# Patient Record
Sex: MALE | Race: BLACK OR AFRICAN AMERICAN | Employment: UNEMPLOYED | URBAN - METROPOLITAN AREA
[De-identification: names, ages, dates, MRNs, and addresses within clinical notes are randomized per-mention and may not be internally consistent; named-entity substitution may affect disease eponyms.]

---

## 2021-05-21 ENCOUNTER — APPOINTMENT (OUTPATIENT)
Dept: ULTRASOUND IMAGING | Age: 31
DRG: 351 | End: 2021-05-21
Payer: MEDICAID

## 2021-05-21 ENCOUNTER — HOSPITAL ENCOUNTER (INPATIENT)
Age: 31
LOS: 3 days | Discharge: HOME | DRG: 351 | End: 2021-05-24
Attending: EMERGENCY MEDICINE | Admitting: INTERNAL MEDICINE
Payer: MEDICAID

## 2021-05-21 DIAGNOSIS — M62.82 NON-TRAUMATIC RHABDOMYOLYSIS: Primary | ICD-10-CM

## 2021-05-21 DIAGNOSIS — R74.01 TRANSAMINITIS: ICD-10-CM

## 2021-05-21 PROBLEM — N50.89 TESTICULAR MICROLITHIASIS: Status: ACTIVE | Noted: 2021-05-21

## 2021-05-21 LAB
-: NORMAL
ABSOLUTE EOS #: 0.11 K/UL (ref 0–0.44)
ABSOLUTE EOS #: 0.17 K/UL (ref 0–0.44)
ABSOLUTE IMMATURE GRANULOCYTE: 0.04 K/UL (ref 0–0.3)
ABSOLUTE IMMATURE GRANULOCYTE: 0.05 K/UL (ref 0–0.3)
ABSOLUTE LYMPH #: 1.45 K/UL (ref 1.1–3.7)
ABSOLUTE LYMPH #: 1.78 K/UL (ref 1.1–3.7)
ABSOLUTE MONO #: 0.57 K/UL (ref 0.1–1.2)
ABSOLUTE MONO #: 0.58 K/UL (ref 0.1–1.2)
ACETAMINOPHEN LEVEL: <5 UG/ML (ref 10–30)
ALBUMIN SERPL-MCNC: 4.5 G/DL (ref 3.5–5.2)
ALBUMIN/GLOBULIN RATIO: 1.6 (ref 1–2.5)
ALP BLD-CCNC: 72 U/L (ref 40–129)
ALT SERPL-CCNC: 301 U/L (ref 5–41)
AMORPHOUS: NORMAL
AMPHETAMINE SCREEN URINE: NEGATIVE
ANION GAP SERPL CALCULATED.3IONS-SCNC: 7 MMOL/L (ref 9–17)
AST SERPL-CCNC: 2206 U/L
BACTERIA: NORMAL
BARBITURATE SCREEN URINE: NEGATIVE
BASOPHILS # BLD: 0 % (ref 0–2)
BASOPHILS # BLD: 0 % (ref 0–2)
BASOPHILS ABSOLUTE: 0.03 K/UL (ref 0–0.2)
BASOPHILS ABSOLUTE: <0.03 K/UL (ref 0–0.2)
BENZODIAZEPINE SCREEN, URINE: NEGATIVE
BILIRUB SERPL-MCNC: 0.84 MG/DL (ref 0.3–1.2)
BILIRUBIN URINE: NEGATIVE
BUN BLDV-MCNC: 8 MG/DL (ref 6–20)
BUN/CREAT BLD: ABNORMAL (ref 9–20)
BUPRENORPHINE URINE: NORMAL
CALCIUM SERPL-MCNC: 9.9 MG/DL (ref 8.6–10.4)
CANNABINOID SCREEN URINE: NEGATIVE
CASTS UA: NORMAL /LPF (ref 0–8)
CHLORIDE BLD-SCNC: 103 MMOL/L (ref 98–107)
CO2: 27 MMOL/L (ref 20–31)
COCAINE METABOLITE, URINE: NEGATIVE
COLOR: YELLOW
COMMENT UA: ABNORMAL
CREAT SERPL-MCNC: 0.68 MG/DL (ref 0.7–1.2)
CRYSTALS, UA: NORMAL /HPF
DIFFERENTIAL TYPE: ABNORMAL
DIFFERENTIAL TYPE: ABNORMAL
EOSINOPHILS RELATIVE PERCENT: 1 % (ref 1–4)
EOSINOPHILS RELATIVE PERCENT: 2 % (ref 1–4)
EPITHELIAL CELLS UA: NORMAL /HPF (ref 0–5)
ETHANOL PERCENT: <0.01 %
ETHANOL: <10 MG/DL
GFR AFRICAN AMERICAN: >60 ML/MIN
GFR NON-AFRICAN AMERICAN: >60 ML/MIN
GFR SERPL CREATININE-BSD FRML MDRD: ABNORMAL ML/MIN/{1.73_M2}
GFR SERPL CREATININE-BSD FRML MDRD: ABNORMAL ML/MIN/{1.73_M2}
GLUCOSE BLD-MCNC: 105 MG/DL (ref 70–99)
GLUCOSE URINE: NEGATIVE
HCT VFR BLD CALC: 48.6 % (ref 40.7–50.3)
HCT VFR BLD CALC: 50 % (ref 40.7–50.3)
HEMOGLOBIN: 15.9 G/DL (ref 13–17)
HEMOGLOBIN: 16.5 G/DL (ref 13–17)
IMMATURE GRANULOCYTES: 0 %
IMMATURE GRANULOCYTES: 1 %
KETONES, URINE: NEGATIVE
LACTIC ACID, WHOLE BLOOD: 1.6 MMOL/L (ref 0.7–2.1)
LACTIC ACID: NORMAL MMOL/L
LEUKOCYTE ESTERASE, URINE: NEGATIVE
LYMPHOCYTES # BLD: 14 % (ref 24–43)
LYMPHOCYTES # BLD: 17 % (ref 24–43)
MCH RBC QN AUTO: 28.9 PG (ref 25.2–33.5)
MCH RBC QN AUTO: 29.2 PG (ref 25.2–33.5)
MCHC RBC AUTO-ENTMCNC: 32.7 G/DL (ref 28.4–34.8)
MCHC RBC AUTO-ENTMCNC: 33 G/DL (ref 28.4–34.8)
MCV RBC AUTO: 87.6 FL (ref 82.6–102.9)
MCV RBC AUTO: 89.2 FL (ref 82.6–102.9)
MDMA URINE: NORMAL
METHADONE SCREEN, URINE: NEGATIVE
METHAMPHETAMINE, URINE: NORMAL
MONOCYTES # BLD: 6 % (ref 3–12)
MONOCYTES # BLD: 6 % (ref 3–12)
MUCUS: NORMAL
MYOGLOBIN: 2894 NG/ML (ref 28–72)
MYOGLOBIN: 4830 NG/ML (ref 28–72)
NITRITE, URINE: NEGATIVE
NRBC AUTOMATED: 0 PER 100 WBC
NRBC AUTOMATED: 0 PER 100 WBC
OPIATES, URINE: NEGATIVE
OTHER OBSERVATIONS UA: NORMAL
OXYCODONE SCREEN URINE: NEGATIVE
PDW BLD-RTO: 12.9 % (ref 11.8–14.4)
PDW BLD-RTO: 13.1 % (ref 11.8–14.4)
PH UA: 7 (ref 5–8)
PHENCYCLIDINE, URINE: NEGATIVE
PLATELET # BLD: 263 K/UL (ref 138–453)
PLATELET # BLD: ABNORMAL K/UL (ref 138–453)
PLATELET ESTIMATE: ABNORMAL
PLATELET ESTIMATE: ABNORMAL
PLATELET, FLUORESCENCE: 204 K/UL (ref 138–453)
PLATELET, IMMATURE FRACTION: 10.1 % (ref 1.1–10.3)
PMV BLD AUTO: 11.9 FL (ref 8.1–13.5)
PMV BLD AUTO: ABNORMAL FL (ref 8.1–13.5)
POTASSIUM SERPL-SCNC: 4.9 MMOL/L (ref 3.7–5.3)
PROPOXYPHENE, URINE: NORMAL
PROTEIN UA: ABNORMAL
RBC # BLD: 5.45 M/UL (ref 4.21–5.77)
RBC # BLD: 5.71 M/UL (ref 4.21–5.77)
RBC # BLD: ABNORMAL 10*6/UL
RBC # BLD: ABNORMAL 10*6/UL
RBC UA: NORMAL /HPF (ref 0–4)
RENAL EPITHELIAL, UA: NORMAL /HPF
SALICYLATE LEVEL: <1 MG/DL (ref 3–10)
SEG NEUTROPHILS: 75 % (ref 36–65)
SEG NEUTROPHILS: 78 % (ref 36–65)
SEGMENTED NEUTROPHILS ABSOLUTE COUNT: 7.79 K/UL (ref 1.5–8.1)
SEGMENTED NEUTROPHILS ABSOLUTE COUNT: 7.91 K/UL (ref 1.5–8.1)
SODIUM BLD-SCNC: 137 MMOL/L (ref 135–144)
SPECIFIC GRAVITY UA: 1.01 (ref 1–1.03)
TEST INFORMATION: NORMAL
TOTAL CK: ABNORMAL U/L (ref 39–308)
TOTAL CK: ABNORMAL U/L (ref 39–308)
TOTAL PROTEIN: 7.3 G/DL (ref 6.4–8.3)
TOXIC TRICYCLIC SC,BLOOD: NEGATIVE
TRICHOMONAS: NORMAL
TRICYCLIC ANTIDEPRESSANTS, UR: NORMAL
TURBIDITY: CLEAR
URINE HGB: ABNORMAL
UROBILINOGEN, URINE: NORMAL
WBC # BLD: 10.1 K/UL (ref 3.5–11.3)
WBC # BLD: 10.4 K/UL (ref 3.5–11.3)
WBC # BLD: ABNORMAL 10*3/UL
WBC # BLD: ABNORMAL 10*3/UL
WBC UA: NORMAL /HPF (ref 0–5)
YEAST: NORMAL

## 2021-05-21 PROCEDURE — 85025 COMPLETE CBC W/AUTO DIFF WBC: CPT

## 2021-05-21 PROCEDURE — 99222 1ST HOSP IP/OBS MODERATE 55: CPT | Performed by: INTERNAL MEDICINE

## 2021-05-21 PROCEDURE — 85055 RETICULATED PLATELET ASSAY: CPT

## 2021-05-21 PROCEDURE — 99284 EMERGENCY DEPT VISIT MOD MDM: CPT

## 2021-05-21 PROCEDURE — 83605 ASSAY OF LACTIC ACID: CPT

## 2021-05-21 PROCEDURE — 80179 DRUG ASSAY SALICYLATE: CPT

## 2021-05-21 PROCEDURE — 82550 ASSAY OF CK (CPK): CPT

## 2021-05-21 PROCEDURE — 80307 DRUG TEST PRSMV CHEM ANLYZR: CPT

## 2021-05-21 PROCEDURE — 6360000002 HC RX W HCPCS: Performed by: STUDENT IN AN ORGANIZED HEALTH CARE EDUCATION/TRAINING PROGRAM

## 2021-05-21 PROCEDURE — 80053 COMPREHEN METABOLIC PANEL: CPT

## 2021-05-21 PROCEDURE — 80143 DRUG ASSAY ACETAMINOPHEN: CPT

## 2021-05-21 PROCEDURE — 93976 VASCULAR STUDY: CPT

## 2021-05-21 PROCEDURE — 81001 URINALYSIS AUTO W/SCOPE: CPT

## 2021-05-21 PROCEDURE — 2580000003 HC RX 258: Performed by: STUDENT IN AN ORGANIZED HEALTH CARE EDUCATION/TRAINING PROGRAM

## 2021-05-21 PROCEDURE — 83874 ASSAY OF MYOGLOBIN: CPT

## 2021-05-21 PROCEDURE — 1200000000 HC SEMI PRIVATE

## 2021-05-21 PROCEDURE — G0480 DRUG TEST DEF 1-7 CLASSES: HCPCS

## 2021-05-21 RX ORDER — SODIUM CHLORIDE 0.9 % (FLUSH) 0.9 %
5-40 SYRINGE (ML) INJECTION EVERY 12 HOURS SCHEDULED
Status: DISCONTINUED | OUTPATIENT
Start: 2021-05-21 | End: 2021-05-24 | Stop reason: HOSPADM

## 2021-05-21 RX ORDER — SODIUM CHLORIDE 0.9 % (FLUSH) 0.9 %
5-40 SYRINGE (ML) INJECTION PRN
Status: DISCONTINUED | OUTPATIENT
Start: 2021-05-21 | End: 2021-05-24 | Stop reason: HOSPADM

## 2021-05-21 RX ORDER — ONDANSETRON 2 MG/ML
4 INJECTION INTRAMUSCULAR; INTRAVENOUS EVERY 6 HOURS PRN
Status: DISCONTINUED | OUTPATIENT
Start: 2021-05-21 | End: 2021-05-24 | Stop reason: HOSPADM

## 2021-05-21 RX ORDER — SODIUM CHLORIDE 9 MG/ML
25 INJECTION, SOLUTION INTRAVENOUS PRN
Status: DISCONTINUED | OUTPATIENT
Start: 2021-05-21 | End: 2021-05-24 | Stop reason: HOSPADM

## 2021-05-21 RX ORDER — SODIUM CHLORIDE 9 MG/ML
INJECTION, SOLUTION INTRAVENOUS CONTINUOUS
Status: DISCONTINUED | OUTPATIENT
Start: 2021-05-21 | End: 2021-05-24 | Stop reason: HOSPADM

## 2021-05-21 RX ORDER — ACETAMINOPHEN 325 MG/1
650 TABLET ORAL EVERY 6 HOURS PRN
Status: DISCONTINUED | OUTPATIENT
Start: 2021-05-21 | End: 2021-05-22

## 2021-05-21 RX ORDER — SODIUM CHLORIDE, SODIUM LACTATE, POTASSIUM CHLORIDE, CALCIUM CHLORIDE 600; 310; 30; 20 MG/100ML; MG/100ML; MG/100ML; MG/100ML
1000 INJECTION, SOLUTION INTRAVENOUS ONCE
Status: COMPLETED | OUTPATIENT
Start: 2021-05-21 | End: 2021-05-21

## 2021-05-21 RX ORDER — ACETAMINOPHEN 650 MG/1
650 SUPPOSITORY RECTAL EVERY 6 HOURS PRN
Status: DISCONTINUED | OUTPATIENT
Start: 2021-05-21 | End: 2021-05-22

## 2021-05-21 RX ORDER — HEPARIN SODIUM 5000 [USP'U]/ML
5000 INJECTION, SOLUTION INTRAVENOUS; SUBCUTANEOUS EVERY 8 HOURS SCHEDULED
Status: DISCONTINUED | OUTPATIENT
Start: 2021-05-21 | End: 2021-05-24 | Stop reason: HOSPADM

## 2021-05-21 RX ORDER — POLYETHYLENE GLYCOL 3350 17 G/17G
17 POWDER, FOR SOLUTION ORAL DAILY PRN
Status: DISCONTINUED | OUTPATIENT
Start: 2021-05-21 | End: 2021-05-24

## 2021-05-21 RX ORDER — PROMETHAZINE HYDROCHLORIDE 12.5 MG/1
12.5 TABLET ORAL EVERY 6 HOURS PRN
Status: DISCONTINUED | OUTPATIENT
Start: 2021-05-21 | End: 2021-05-24 | Stop reason: HOSPADM

## 2021-05-21 RX ORDER — 0.9 % SODIUM CHLORIDE 0.9 %
1000 INTRAVENOUS SOLUTION INTRAVENOUS ONCE
Status: COMPLETED | OUTPATIENT
Start: 2021-05-21 | End: 2021-05-21

## 2021-05-21 RX ADMIN — SODIUM CHLORIDE, POTASSIUM CHLORIDE, SODIUM LACTATE AND CALCIUM CHLORIDE 1000 ML: 600; 310; 30; 20 INJECTION, SOLUTION INTRAVENOUS at 09:48

## 2021-05-21 RX ADMIN — SODIUM CHLORIDE: 9 INJECTION, SOLUTION INTRAVENOUS at 15:51

## 2021-05-21 RX ADMIN — SODIUM CHLORIDE 1000 ML: 9 INJECTION, SOLUTION INTRAVENOUS at 12:36

## 2021-05-21 RX ADMIN — HEPARIN SODIUM 5000 UNITS: 5000 INJECTION INTRAVENOUS; SUBCUTANEOUS at 17:35

## 2021-05-21 RX ADMIN — HEPARIN SODIUM 5000 UNITS: 5000 INJECTION INTRAVENOUS; SUBCUTANEOUS at 21:42

## 2021-05-21 SDOH — HEALTH STABILITY: MENTAL HEALTH: HOW OFTEN DO YOU HAVE A DRINK CONTAINING ALCOHOL?: NEVER

## 2021-05-21 ASSESSMENT — PAIN SCALES - GENERAL: PAINLEVEL_OUTOF10: 6

## 2021-05-21 ASSESSMENT — ENCOUNTER SYMPTOMS
BACK PAIN: 0
VOMITING: 0
ABDOMINAL PAIN: 0
NAUSEA: 0
SHORTNESS OF BREATH: 0

## 2021-05-21 NOTE — ED NOTES
Dr. Santiago Krause at bedside to evaluate pt.  examination preformed at this time with writer at bedside to chaperone.       Elbert Gray RN  05/21/21 8275

## 2021-05-21 NOTE — PLAN OF CARE
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 Urology recommends no further inpatient work-up and recommends outpatient follow-up. Liliana Drummond MD  Internal Medicine Resident, PGY- [address] [place], [place]  5/21/2021, 12:21 PM

## 2021-05-21 NOTE — ED NOTES
Pt resting on cot, alert, oriented, no distress noted at this time. Guards at bedside. Awaiting bed placement, pt denies any further needs at this time, will continue with plan of care.       Hernandez Weiner RN  05/21/21 7967

## 2021-05-21 NOTE — ED PROVIDER NOTES
[facility] ED  Emergency Department Encounter  Emergency Medicine Resident     Pt Name: John Mujica  MRN: [id number]  [place] 1990  Date of evaluation: 5/21/21  PCP:  No primary care provider on file. CHIEF COMPLAINT       Chief Complaint   Patient presents with    Testicle Pain     left sided testicle pain, swelling,.  Hematuria     dark urine. was sent to [facility] yesterday 3 days after working out due to testicle pain, swelling, dark urine. pt states they admitted him to the [facility] due to rhabdo. pt states he worked out for the first time in over a year with lunges and squats. HISTORY OFPRESENT ILLNESS  (Location/Symptom, Timing/Onset, Context/Setting, Quality, Duration, Modifying Factors,Severity.)      John Mujica is a 35 y. o.yo male who presents with abnormal labs, testicular pain. Patient here with hematuria, testicular pain, states that he will was working out intensely 5 days ago, noticed testicular pain the day after his workout, hematuria 2 days out from the workout and has had a work-up yesterday that found his CK to be 15,000 and liver enzymes to be elevated. States he is feeling myalgias all over, continues to have some testicular pain and mild swelling. Denies any blood at the tip of his penis, denies traumatic injuries to the penis or testes. Denies prior history of rhabdo. Denies any kind of kidney history, muscular dystrophy history or history of any other muscular disorders. Denies chest pain shortness of breath, headache or vision changes or focal neuro deficits. Denies any flank pain, rashes, traumatic injuries such as falls. Denies any use of Tylenol excessively, denies history of hepatitis. PAST MEDICAL / SURGICAL / SOCIAL / FAMILY HISTORY      has no past medical history on file. has no past surgical history on file.      Social History     Socioeconomic History    Marital status: Single     Spouse name: Not on file    Number of children: Not on file    Years of education: Not on file    Highest education level: Not on file   Occupational History    Not on file   Tobacco Use    Smoking status: Never Smoker    Smokeless tobacco: Never Used   Substance and Sexual Activity    Alcohol use: Never    Drug use: Not Currently    Sexual activity: Not Currently   Other Topics Concern    Not on file   Social History Narrative    Not on file     Social Determinants of Health     Financial Resource Strain:     Difficulty of Paying Living Expenses:    Food Insecurity:     Worried About Running Out of Food in the Last Year:     [address] in the Last Year:    Transportation Needs:     Lack of Transportation (Medical):  Lack of Transportation (Non-Medical):    Physical Activity:     Days of Exercise per Week:     Minutes of Exercise per Session:    Stress:     Feeling of Stress :    Social Connections:     Frequency of Communication with Friends and Family:     Frequency of Social Gatherings with Friends and Family:     Attends [employer] Services:     Active Member of Clubs or Organizations:     Attends Club or Organization Meetings:     Marital Status:    Intimate Partner Violence:     Fear of Current or Ex-Partner:     Emotionally Abused:     Physically Abused:     Sexually Abused:        No family history on file. Allergies:  Patient has no known allergies. Home Medications:  Prior to Admission medications    Not on File       REVIEW OFSYSTEMS    (2-9 systems for level 4, 10 or more for level 5)      Review of Systems   Constitutional: Negative for diaphoresis and fever. HENT: Negative for congestion. Eyes: Negative for visual disturbance. Respiratory: Negative for shortness of breath. Cardiovascular: Negative for chest pain. Gastrointestinal: Negative for abdominal pain, nausea and vomiting. Endocrine: Negative for polyuria. Genitourinary: Positive for testicular pain. Negative for dysuria. Musculoskeletal: Positive for myalgias. Negative for back pain. Skin: Negative for wound. Neurological: Negative for headaches. Psychiatric/Behavioral: Negative for confusion. PHYSICAL EXAM   (up to 7 for level 4, 8 or more forlevel 5)      ED TRIAGE VITALS BP: (!) 166/91, Temp: 96.7 °F (35.9 °C), Pulse: 78, Resp: 18, SpO2: 98 %    Vitals:    05/21/21 0941 05/21/21 0946   BP: (!) 166/91    Pulse: 78    Resp: 18    Temp:  96.7 °F (35.9 °C)   TempSrc:  Oral   SpO2: 98%    Weight: 262 lb (118.8 kg)    Height: 6' 2\" (1.88 m)        Physical Exam  Constitutional:       General: He is not in acute distress. Appearance: He is well-developed. HENT:      Head: Normocephalic and atraumatic. Nose: Nose normal.   Eyes:      Pupils: Pupils are equal, round, and reactive to light. Cardiovascular:      Rate and Rhythm: Normal rate and regular rhythm. Heart sounds: No murmur heard. Pulmonary:      Effort: Pulmonary effort is normal. No respiratory distress. Breath sounds: No stridor. No wheezing. Abdominal:      General: There is no distension. Palpations: Abdomen is soft. Tenderness: There is no abdominal tenderness. Genitourinary:     Penis: No swelling. Testes:         Right: Tenderness present. Swelling not present. Left: Tenderness present. Swelling not present. Lux stage (genital): 5. Musculoskeletal:         General: No tenderness. Normal range of motion. Cervical back: Normal range of motion and neck supple. Skin:     General: Skin is warm and dry. Capillary Refill: Capillary refill takes less than 2 seconds. Findings: No erythema or rash. Neurological:      Mental Status: He is alert and oriented to person, place, and time. Sensory: No sensory deficit.       Deep Tendon Reflexes: Reflexes normal.   Psychiatric:         Behavior: Behavior normal.         DIFFERENTIAL  DIAGNOSIS     PLAN (LABS / IMAGING / EKG):  Orders Placed This Encounter   Procedures    US SCROTUM W LIMITED DUPLEX    CBC Auto Differential    Comprehensive Metabolic Panel w/ Reflex to MG    CK    MYOGLOBIN, SERUM    Urinalysis, reflex to microscopic    Lactic Acid, Plasma    Microscopic Urinalysis    DRUG SCREEN MULTI URINE    Basic Metabolic Panel w/ Reflex to MG    CK    MYOGLOBIN, SERUM    CBC auto differential    TOX SCR, BLD, ED    DIET RENAL;    Telemetry monitoring    Vital signs per unit routine    Notify physician    Up as tolerated    Strict intake and output    Inpatient consult to Internal Medicine    Inpatient consult to Case Management    OT eval and treat    PT evaluation and treat    Initiate Oxygen Therapy Protocol    Saline lock IV    Insert peripheral IV    PATIENT STATUS (FROM ED OR OR/PROCEDURAL) Inpatient       MEDICATIONS ORDERED:  Orders Placed This Encounter   Medications    lactated ringers infusion 1,000 mL    sodium chloride flush 0.9 % injection 5-40 mL    sodium chloride flush 0.9 % injection 5-40 mL    0.9 % sodium chloride infusion    OR Linked Order Group     promethazine (PHENERGAN) tablet 12.5 mg     ondansetron (ZOFRAN) injection 4 mg    polyethylene glycol (GLYCOLAX) packet 17 g    OR Linked Order Group     acetaminophen (TYLENOL) tablet 650 mg     acetaminophen (TYLENOL) suppository 650 mg    heparin (porcine) injection 5,000 Units    0.9 % sodium chloride bolus    0.9 % sodium chloride infusion       DDX:     Rhabdomyolysis, hepatitis, transaminitis, testicular torsion, UTI, kidney stones    Initial MDM/Plan: 35 y.o. male who presents with hematuria, abnormal labs. Rhabdomyolysis: CK myoglobin elevated severely, started on IV hydration admitted to internal medicine-likely secondary to intense exertion    Hepatitis/transaminitis: AST elevation likely secondary to muscle breakdown, has no history of hepatitis, not a daily drinker, no history of cirrhosis.     Testicular torsion: Here with bilateral testicular pain, no swelling, scrotal ultrasound shows: bilateral microlithiasis    UTI: No dysuria no frequency, no history of UTIs in the past, urinalysis negative    Kidney stone: No history of stones in the past, does not describe flank pain no radiation of the pain into the testicular region    DIAGNOSTIC RESULTS / EMERGENCYDEPARTMENT COURSE / MDM     LABS:  Results for orders placed or performed during the hospital encounter of 05/21/21   CBC Auto Differential   Result Value Ref Range    WBC 10.1 3.5 - 11.3 k/uL    RBC 5.71 4.21 - 5.77 m/uL    Hemoglobin 16.5 13.0 - 17.0 g/dL    Hematocrit 50.0 40.7 - 50.3 %    MCV 87.6 82.6 - 102.9 fL    MCH 28.9 25.2 - 33.5 pg    MCHC 33.0 28.4 - 34.8 g/dL    RDW 13.1 11.8 - 14.4 %    Platelets 116 933 - 586 k/uL    MPV 11.9 8.1 - 13.5 fL    NRBC Automated 0.0 0.0 per 100 WBC    Differential Type NOT REPORTED     Seg Neutrophils 78 (H) 36 - 65 %    Lymphocytes 14 (L) 24 - 43 %    Monocytes 6 3 - 12 %    Eosinophils % 1 1 - 4 %    Basophils 0 0 - 2 %    Immature Granulocytes 1 (H) 0 %    Segs Absolute 7.91 1.50 - 8.10 k/uL    Absolute Lymph # 1.45 1.10 - 3.70 k/uL    Absolute Mono # 0.58 0.10 - 1.20 k/uL    Absolute Eos # 0.11 0.00 - 0.44 k/uL    Basophils Absolute 0.03 0.00 - 0.20 k/uL    Absolute Immature Granulocyte 0.05 0.00 - 0.30 k/uL    WBC Morphology NOT REPORTED     RBC Morphology NOT REPORTED     Platelet Estimate NOT REPORTED    Comprehensive Metabolic Panel w/ Reflex to MG   Result Value Ref Range    Glucose 105 (H) 70 - 99 mg/dL    BUN 8 6 - 20 mg/dL    CREATININE 0.68 (L) 0.70 - 1.20 mg/dL    Bun/Cre Ratio NOT REPORTED 9 - 20    Calcium 9.9 8.6 - 10.4 mg/dL    Sodium 137 135 - 144 mmol/L    Potassium 4.9 3.7 - 5.3 mmol/L    Chloride 103 98 - 107 mmol/L    CO2 27 20 - 31 mmol/L    Anion Gap 7 (L) 9 - 17 mmol/L    Alkaline Phosphatase 72 40 - 129 U/L     (H) 5 - 41 U/L    AST 2,206 (H) <40 U/L    Total Bilirubin 0.84 0.3 - 1.2 mg/dL EMERGENCY DEPARTMENT COURSE:  ED Course as of May 21 1223   Fri May 21, 2021   0957 Patient seen and assessed in the emergency department no acute respiratory cardiovascular distress. Patient here with hematuria, testicular pain, states that he will was working out intensely 5 days ago, noticed testicular pain the day after his workout, hematuria 2 days out from the workout and has had a work-up yesterday that found his CK to be 15,000 and liver enzymes to be elevated. States he is feeling myalgias all over, continues to have some testicular pain and mild swelling. Denies any blood at the tip of his penis, denies traumatic injuries to the penis or testes. Denies prior history of rhabdo. Denies any kind of kidney history, muscular dystrophy history or history of any other muscular disorders. Denies chest pain shortness of breath, headache or vision changes or focal neuro deficits. Denies any flank pain, rashes, traumatic injuries such as falls. Denies any use of Tylenol excessively, denies history of hepatitis. [PS]   6768 Myoglobin(!): 4,830 [PS]   1038 Likely secondary to muscle breakdown   AST(!): 2,206 [PS]      ED Course User Index  [PS] Brittany Dow MD          PROCEDURES:  None    CONSULTS:  IP CONSULT TO INTERNAL MEDICINE  IP CONSULT TO CASE MANAGEMENT    CRITICAL CARE:  Please see attending note    FINAL IMPRESSION      1. Non-traumatic rhabdomyolysis    2.  Transaminitis          DISPOSITION / PLAN     DISPOSITION Admitted 05/21/2021 11:08:38 AM       PATIENT REFERRED TO:  [facility]  [address]  [address] [facility] [address] [place] [address]  [phone]    With urology, please call to schedule if you are not contacted       DISCHARGE MEDICATIONS:  New Prescriptions    No medications on file       Brittany Dow MD  Emergency Medicine Resident    (Please note that portions of this note were completed with a voice recognition program.Efforts were made to edit the dictations but occasionally words are mis-transcribed.)       Bhanu Zamudio MD  Resident  05/21/21 6237

## 2021-05-21 NOTE — ED NOTES
The following labs were labeled with patient stickers & tubed to lab;    [x]Lavender   []On Ice  [x]Blue  [x]Green/ Yellow  [x]Green/ Black [x]On Ice  []Pink  []Red  []Yellow    []COVID-19 Swab []Rapid    []Urine Sample  []Pelvic Cultures    []Blood Cultures       Reid Nova RN  05/21/21 2559

## 2021-05-21 NOTE — ED NOTES
Pt ambulatory to restroom with steady gait. Guards remain at bedside.       Garret Morales RN  05/21/21 9497

## 2021-05-21 NOTE — ED NOTES
Pt returned from [address].       Enid Almanza RN  05/21/21 4826

## 2021-05-21 NOTE — H&P
Lymph # 1.45 1.10 - 3.70 k/uL    Absolute Mono # 0.58 0.10 - 1.20 k/uL    Absolute Eos # 0.11 0.00 - 0.44 k/uL    Basophils Absolute 0.03 0.00 - 0.20 k/uL    Absolute Immature Granulocyte 0.05 0.00 - 0.30 k/uL    WBC Morphology NOT REPORTED     RBC Morphology NOT REPORTED     Platelet Estimate NOT REPORTED    Comprehensive Metabolic Panel w/ Reflex to MG    Collection Time: 05/21/21  9:47 AM   Result Value Ref Range    Glucose 105 (H) 70 - 99 mg/dL    BUN 8 6 - 20 mg/dL    CREATININE 0.68 (L) 0.70 - 1.20 mg/dL    Bun/Cre Ratio NOT REPORTED 9 - 20    Calcium 9.9 8.6 - 10.4 mg/dL    Sodium 137 135 - 144 mmol/L    Potassium 4.9 3.7 - 5.3 mmol/L    Chloride 103 98 - 107 mmol/L    CO2 27 20 - 31 mmol/L    Anion Gap 7 (L) 9 - 17 mmol/L    Alkaline Phosphatase 72 40 - 129 U/L     (H) 5 - 41 U/L    AST 2,206 (H) <40 U/L    Total Bilirubin 0.84 0.3 - 1.2 mg/dL    Total Protein 7.3 6.4 - 8.3 g/dL    Albumin 4.5 3.5 - 5.2 g/dL    Albumin/Globulin Ratio 1.6 1.0 - 2.5    GFR Non-African American >60 >60 mL/min    GFR African American >60 >60 mL/min    GFR Comment          GFR Staging NOT REPORTED    CK    Collection Time: 05/21/21  9:47 AM   Result Value Ref Range    Total ,300 (H) 39 - 308 U/L   MYOGLOBIN, SERUM    Collection Time: 05/21/21  9:47 AM   Result Value Ref Range    Myoglobin 4,830 (H) 28 - 72 ng/mL   Lactic Acid, Plasma    Collection Time: 05/21/21  9:47 AM   Result Value Ref Range    Lactic Acid NOT REPORTED mmol/L    Lactic Acid, Whole Blood 1.6 0.7 - 2.1 mmol/L   Urinalysis, reflex to microscopic    Collection Time: 05/21/21 10:22 AM   Result Value Ref Range    Color, UA YELLOW YELLOW    Turbidity UA CLEAR CLEAR    Glucose, Ur NEGATIVE NEGATIVE    Bilirubin Urine NEGATIVE NEGATIVE    Ketones, Urine NEGATIVE NEGATIVE    Specific Gravity, UA 1.013 1.005 - 1.030    Urine Hgb LARGE (A) NEGATIVE    pH, UA 7.0 5.0 - 8.0    Protein, UA 2+ (A) NEGATIVE    Urobilinogen, Urine Normal Normal    Nitrite, Urine NEGATIVE NEGATIVE    Leukocyte Esterase, Urine NEGATIVE NEGATIVE    Urinalysis Comments NOT REPORTED    Microscopic Urinalysis    Collection Time: 05/21/21 10:22 AM   Result Value Ref Range    -          WBC, UA 0 TO 2 0 - 5 /HPF    RBC, UA 0 TO 2 0 - 4 /HPF    Casts UA  0 - 8 /LPF     0 TO 2 HYALINE Reference range defined for non-centrifuged specimen. Crystals, UA NOT REPORTED None /HPF    Epithelial Cells UA 0 TO 2 0 - 5 /HPF    Renal Epithelial, UA NOT REPORTED 0 /HPF    Bacteria, UA NOT REPORTED None    Mucus, UA NOT REPORTED None    Trichomonas, UA NOT REPORTED None    Amorphous, UA NOT REPORTED None    Other Observations UA NOT REPORTED NOT REQ. Yeast, UA NOT REPORTED None       Imaging:   US SCROTUM W LIMITED DUPLEX    Result Date: 5/21/2021  No evidence of intratesticular mass or torsion. However, the patient has bilateral microlithiasis which is associated with an increased lifetime risk of testicular carcinoma. Annual scrotal ultrasound therefore advised. RECOMMENDATIONS: Advise annual ultrasonography of the scrotum. ASSESSMENT & PLAN     ASSESSMENT / PLAN:     IMPRESSION  This is a 35 y.o. male who presented with testicular pain and swelling with hematuria and found to have increased CK and myoglobin patient admitted to inpatient status because of rhabdomyolysis    Principal Problem:    Rhabdomyolysis  Active Problems:    Transaminitis    Testicular microlithiasis  Resolved Problems:    * No resolved hospital problems. *    Rhabdomyolysis -continue with IV fluid bolus and IV normal saline 200 mL/h with a goal urine output of 200ml/hr, maintain strict input output. Follow-up on CK and myoglobin, follow-up on BMP    Testicular microlithiasis-urology was consulted and recommends outpatient follow-up follow-up with urology on discharge.     [id number] on LFT, continue with IV fluids    DVT ppx: Heparin 5000 units  GI ppx: Not indicated    PT/OT/SW: Consulted  Discharge Planning:

## 2021-05-21 NOTE — H&P
Attending Supervising Physicians Attestation Statement  I have seen and examined Chiquis Grounds and the meléndez elements of all parts of the encounter have been performed by me. I agree with the assessment, plan and orders as documented by the Advanced Practice Provider. I discussed the findings and plans with the resident physician and agree as documented in their note . See note of admitting resident for more details    In addition to the pertinent positives and negatives as stated within HPI and the review of systems as documented in the notes, all other systems were reviewed when able to and are reported negative. Additional Comments:   35 M presented with testicular pain  Underwent testicular US - no evidence of intratesticular mass or torsion  Urology consulted - OP follow up for bilateral microlithiasis   Noted to have elevated CK - 143,300High   Admitted for rhabdomyolysis  No renal dysfunction     Principal Problem:    Rhabdomyolysis  Active Problems:    Transaminitis    Testicular microlithiasis  Resolved Problems:    * No resolved hospital problems.  *    - Continue IVF  - Monitor CK  - Monitor LFTs     Electronically signed by Jolie Richard MD on 5/21/2021 at 5:57 PM

## 2021-05-21 NOTE — ED NOTES
Pt to ed from [facility] with guards at bedside. Pt states about 4-5 days ago he started working out again, lunges, squats, playing basketball. Pt states that he first noticed pain in his left testicle about 3 days ago that has since moved into the right testicle and the left groin. Pt went to medical, they did urine screen and noticed his urine was dark, they admitted him to medical, did lab work, urine and gave IV fluids. Pt ALT, AST, CK were elevated and pt sent out for evaluation with diagnosis of rhabdo from medical. Pt is alert, oriented, speaking in full, complete sentences, no distress noted. Pt rates pain 6/10 to his testicles with palpation.       Dejan Cox RN  05/21/21 1000

## 2021-05-22 ENCOUNTER — APPOINTMENT (OUTPATIENT)
Dept: ULTRASOUND IMAGING | Age: 31
DRG: 351 | End: 2021-05-22
Payer: MEDICAID

## 2021-05-22 ENCOUNTER — TELEPHONE (OUTPATIENT)
Dept: UROLOGY | Age: 31
End: 2021-05-22

## 2021-05-22 LAB
ALBUMIN SERPL-MCNC: 4 G/DL (ref 3.5–5.2)
ALBUMIN/GLOBULIN RATIO: 1.4 (ref 1–2.5)
ALP BLD-CCNC: 66 U/L (ref 40–129)
ALT SERPL-CCNC: 239 U/L (ref 5–41)
ANION GAP SERPL CALCULATED.3IONS-SCNC: 8 MMOL/L (ref 9–17)
AST SERPL-CCNC: 1099 U/L
BILIRUB SERPL-MCNC: 0.77 MG/DL (ref 0.3–1.2)
BILIRUBIN DIRECT: 0.15 MG/DL
BILIRUBIN, INDIRECT: 0.62 MG/DL (ref 0–1)
BUN BLDV-MCNC: 10 MG/DL (ref 6–20)
CALCIUM SERPL-MCNC: 9.4 MG/DL (ref 8.6–10.4)
CHLORIDE BLD-SCNC: 105 MMOL/L (ref 98–107)
CO2: 26 MMOL/L (ref 20–31)
CREAT SERPL-MCNC: 0.6 MG/DL (ref 0.7–1.2)
GFR AFRICAN AMERICAN: >60 ML/MIN
GFR NON-AFRICAN AMERICAN: >60 ML/MIN
GFR SERPL CREATININE-BSD FRML MDRD: ABNORMAL ML/MIN/{1.73_M2}
GFR SERPL CREATININE-BSD FRML MDRD: ABNORMAL ML/MIN/{1.73_M2}
GLUCOSE BLD-MCNC: 100 MG/DL (ref 70–99)
HAV IGM SER IA-ACNC: NONREACTIVE
HEPATITIS B CORE IGM ANTIBODY: NONREACTIVE
HEPATITIS B SURFACE ANTIGEN: NONREACTIVE
HEPATITIS C ANTIBODY: NONREACTIVE
MYOGLOBIN: 1791 NG/ML (ref 28–72)
POTASSIUM SERPL-SCNC: 4.6 MMOL/L (ref 3.7–5.3)
SODIUM BLD-SCNC: 139 MMOL/L (ref 135–144)
TOTAL CK: ABNORMAL U/L (ref 39–308)
TOTAL PROTEIN: 6.8 G/DL (ref 6.4–8.3)

## 2021-05-22 PROCEDURE — 80053 COMPREHEN METABOLIC PANEL: CPT

## 2021-05-22 PROCEDURE — 82550 ASSAY OF CK (CPK): CPT

## 2021-05-22 PROCEDURE — 1200000000 HC SEMI PRIVATE

## 2021-05-22 PROCEDURE — 83874 ASSAY OF MYOGLOBIN: CPT

## 2021-05-22 PROCEDURE — 82248 BILIRUBIN DIRECT: CPT

## 2021-05-22 PROCEDURE — 2580000003 HC RX 258: Performed by: STUDENT IN AN ORGANIZED HEALTH CARE EDUCATION/TRAINING PROGRAM

## 2021-05-22 PROCEDURE — 6360000002 HC RX W HCPCS: Performed by: STUDENT IN AN ORGANIZED HEALTH CARE EDUCATION/TRAINING PROGRAM

## 2021-05-22 PROCEDURE — 36415 COLL VENOUS BLD VENIPUNCTURE: CPT

## 2021-05-22 PROCEDURE — 80074 ACUTE HEPATITIS PANEL: CPT

## 2021-05-22 PROCEDURE — 76705 ECHO EXAM OF ABDOMEN: CPT

## 2021-05-22 PROCEDURE — 99232 SBSQ HOSP IP/OBS MODERATE 35: CPT | Performed by: INTERNAL MEDICINE

## 2021-05-22 RX ADMIN — SODIUM CHLORIDE: 9 INJECTION, SOLUTION INTRAVENOUS at 23:21

## 2021-05-22 RX ADMIN — HEPARIN SODIUM 5000 UNITS: 5000 INJECTION INTRAVENOUS; SUBCUTANEOUS at 23:21

## 2021-05-22 RX ADMIN — SODIUM CHLORIDE: 9 INJECTION, SOLUTION INTRAVENOUS at 07:53

## 2021-05-22 RX ADMIN — HEPARIN SODIUM 5000 UNITS: 5000 INJECTION INTRAVENOUS; SUBCUTANEOUS at 06:37

## 2021-05-22 ASSESSMENT — PAIN SCALES - GENERAL: PAINLEVEL_OUTOF10: 0

## 2021-05-22 NOTE — FLOWSHEET NOTE
[address] [facility] [address]  PROGRESS NOTE    Room # 0326/0326-02   Name: Ilya Magana            Age: 35 y.o. Gender: male            Admit Date & Time: 5/21/2021  9:34 AM    PATIENT/EVENT DESCRIPTION:  Ilya Magana is a 35 y.o. male        SPIRITUAL ASSESSMENT/INTERVENTION:  The patient was calm and passive. The patient had two prison guards in his room at the time of the visit. The patient was not interested in a spiritual care at this time. SPIRITUAL CARE FOLLOW-UP PLAN:  Chaplains will remain available to offer spiritual and emotional support as needed.     Chaplains can be contacted 24/7 by [address] [place] [address]    Electronically signed by Addison Costa, on 5/22/2021 at 1:45 PM.  Ephraim Vivar  [phone]             05/22/21 1344   Encounter Summary   Services provided to: Patient   Referral/Consult From: Rounding   Continue Visiting   (05/22/21)   Complexity of Encounter Low   Length of Encounter 15 minutes   Routine   Type Initial   Assessment Calm;Tearful   Intervention Nurtured hope   Outcome Refused/declined

## 2021-05-22 NOTE — ED NOTES
Pt resting on cot with eyes closed, even rise and fall of chest, NAD noted. Officers at bedside. Will continue to monitor.          Denia Marquez RN  05/22/21 5201

## 2021-05-22 NOTE — ED NOTES
Pt resting on cot with eyes closed, even rise and fall of chest, NAD noted. Officers at bedside. Will continue to monitor.          Jamia Pagan RN  05/22/21 1964

## 2021-05-22 NOTE — ED NOTES
Pt back to stretcher after using restroom and placed back on monitor.  No needs at this time     Magui Caldwell RN  05/21/21 2013

## 2021-05-22 NOTE — ED NOTES
Pt alert in bed. correction officers at bedside with patient. Pt denies needs at this time. Vitals obtained. Call light in reach, white board updated.        Erica Morales RN  05/22/21 4496

## 2021-05-22 NOTE — ED NOTES
Pt resting on cot with eyes closed, even rise and fall of chest, NAD noted. Officers at bedside. Will continue to monitor.          Johnna Bray RN  05/22/21 9255

## 2021-05-22 NOTE — ED NOTES
Pt resting on cot with eyes closed, even rise and fall of chest, NAD noted. Officers at bedside. Will continue to monitor.            [place] [address], [place]OU[place]  05/22/21 8635

## 2021-05-22 NOTE — PROGRESS NOTES
anything in the last 2 to 3 days        Initial evaluation in the ER patient was found to be having increased creatinine kinase and myoglobin. Ultrasound scrotum showed bilateral microlithiasis. Urology was consulted and recommends no work-up while being inpatient and recommends to follow as outpatient. Ultrasound scrotum is negative for any mass or torsion.     Patient was found to be having blood pressure of 166/91 on admission decreased to 139/86.     He denies any other complaints such as chest pain, shortness of breath, headache, dizziness, nausea, vomiting,.     Labs showed unremarkable CBC. Elevated liver enzymes, urine tox screen negative  Urinalysis showing large hemoglobinuria    OBJECTIVE     Vital Signs:  BP (!) 145/103   Pulse 87   Temp 96.7 °F (35.9 °C) (Oral)   Resp 17   Ht 6' 2\" (1.88 m)   Wt 262 lb (118.8 kg)   SpO2 98%   BMI 33.64 kg/m²     Temp (24hrs), Av.7 °F (35.9 °C), Min:96.7 °F (35.9 °C), Max:96.7 °F (35.9 °C)    In: -   Out:  [Urine:]    Physical Exam:  Constitutional: This is a well developed, well nourished, 30-34.9 - Obesity Grade I 35y.o. year old male who is alert, oriented, cooperative and in no apparent distress. Head:normocephalic and atraumatic. EENT:  PERRLA. No conjunctival injections. Septum was midline, mucosa was without erythema, exudates or cobblestoning. No thrush was noted. Neck: Supple without thyromegaly. No elevated JVP. Trachea was midline. Respiratory: Chest was symmetrical without dullness to percussion. Breath sounds bilaterally were clear to auscultation. There were no wheezes, rhonchi or rales. There is no intercostal retraction or use of accessory muscles. No egophony noted. Cardiovascular: Regular without murmur, clicks, gallops or rubs. Abdomen: Slightly rounded and soft without organomegaly. No rebound, rigidity or guarding was appreciated. Lymphatic: No lymphadenopathy.   Musculoskeletal: Normal curvature of the torsion. However, the patient has bilateral microlithiasis which is associated with an increased lifetime risk of testicular carcinoma. Annual scrotal ultrasound therefore advised. RECOMMENDATIONS: Advise annual ultrasonography of the scrotum. ASSESSMENT & PLAN     ASSESSMENT / PLAN:     Principal Problem:    Rhabdomyolysis  Active Problems:    Transaminitis    Testicular microlithiasis  Resolved Problems:    * No resolved hospital problems. *      Rhabdomyolysis -continue with IV normal saline 200 mL/h with a goal urine output of 200ml/hr, maintain strict input output. Follow-up on CK and myoglobin, follow-up on BMP.     Testicular microlithiasis-urology was consulted and recommends outpatient follow-up follow-up with urology on discharge.     Uuptivpibmcir-qysvvf-mi on LFT, continue with IV fluids.       DVT ppx : Heparin 5000 units  GI ppx: Not indicated    PT/OT: On board  Discharge Planning / SW:  to help with discharging the patient    Ashwini Nielsen MD  Internal Medicine Resident, [facility]- [facility];  [place], [place]  5/22/2021, 6:29 AM

## 2021-05-22 NOTE — TELEPHONE ENCOUNTER
----- Message from Gina Walsh PA-C sent at 5/21/2021 12:19 PM EDT -----  Hi MAs,     Can you please arrange follow up in 2-3 weeks for testicular pain. Any doc is fine. Thanks!   [employer]

## 2021-05-23 LAB
ABSOLUTE EOS #: 0.23 K/UL (ref 0–0.44)
ABSOLUTE IMMATURE GRANULOCYTE: 0.06 K/UL (ref 0–0.3)
ABSOLUTE LYMPH #: 1.72 K/UL (ref 1.1–3.7)
ABSOLUTE MONO #: 0.51 K/UL (ref 0.1–1.2)
ALBUMIN SERPL-MCNC: 3.8 G/DL (ref 3.5–5.2)
ALBUMIN/GLOBULIN RATIO: 1.5 (ref 1–2.5)
ALP BLD-CCNC: 62 U/L (ref 40–129)
ALT SERPL-CCNC: 173 U/L (ref 5–41)
ANION GAP SERPL CALCULATED.3IONS-SCNC: 8 MMOL/L (ref 9–17)
AST SERPL-CCNC: 437 U/L
BASOPHILS # BLD: 1 % (ref 0–2)
BASOPHILS ABSOLUTE: 0.04 K/UL (ref 0–0.2)
BILIRUB SERPL-MCNC: 0.7 MG/DL (ref 0.3–1.2)
BILIRUBIN DIRECT: 0.18 MG/DL
BILIRUBIN, INDIRECT: 0.52 MG/DL (ref 0–1)
BUN BLDV-MCNC: 12 MG/DL (ref 6–20)
CALCIUM SERPL-MCNC: 9.2 MG/DL (ref 8.6–10.4)
CHLORIDE BLD-SCNC: 103 MMOL/L (ref 98–107)
CO2: 26 MMOL/L (ref 20–31)
CREAT SERPL-MCNC: 0.61 MG/DL (ref 0.7–1.2)
DIFFERENTIAL TYPE: ABNORMAL
EOSINOPHILS RELATIVE PERCENT: 3 % (ref 1–4)
GFR AFRICAN AMERICAN: >60 ML/MIN
GFR NON-AFRICAN AMERICAN: >60 ML/MIN
GFR SERPL CREATININE-BSD FRML MDRD: ABNORMAL ML/MIN/{1.73_M2}
GFR SERPL CREATININE-BSD FRML MDRD: ABNORMAL ML/MIN/{1.73_M2}
GLUCOSE BLD-MCNC: 103 MG/DL (ref 70–99)
HCT VFR BLD CALC: 51.1 % (ref 40.7–50.3)
HEMOGLOBIN: 16.9 G/DL (ref 13–17)
IMMATURE GRANULOCYTES: 1 %
LYMPHOCYTES # BLD: 20 % (ref 24–43)
MCH RBC QN AUTO: 28.9 PG (ref 25.2–33.5)
MCHC RBC AUTO-ENTMCNC: 33.1 G/DL (ref 28.4–34.8)
MCV RBC AUTO: 87.5 FL (ref 82.6–102.9)
MONOCYTES # BLD: 6 % (ref 3–12)
MYOGLOBIN: 788 NG/ML (ref 28–72)
MYOGLOBIN: 846 NG/ML (ref 28–72)
NRBC AUTOMATED: 0 PER 100 WBC
PDW BLD-RTO: 12.8 % (ref 11.8–14.4)
PLATELET # BLD: 272 K/UL (ref 138–453)
PLATELET ESTIMATE: ABNORMAL
PMV BLD AUTO: 11.5 FL (ref 8.1–13.5)
POTASSIUM SERPL-SCNC: 4.4 MMOL/L (ref 3.7–5.3)
RBC # BLD: 5.84 M/UL (ref 4.21–5.77)
RBC # BLD: ABNORMAL 10*6/UL
SEG NEUTROPHILS: 69 % (ref 36–65)
SEGMENTED NEUTROPHILS ABSOLUTE COUNT: 6.2 K/UL (ref 1.5–8.1)
SODIUM BLD-SCNC: 137 MMOL/L (ref 135–144)
TOTAL CK: 7004 U/L (ref 39–308)
TOTAL CK: 9780 U/L (ref 39–308)
TOTAL PROTEIN: 6.3 G/DL (ref 6.4–8.3)
WBC # BLD: 8.8 K/UL (ref 3.5–11.3)
WBC # BLD: ABNORMAL 10*3/UL

## 2021-05-23 PROCEDURE — 80053 COMPREHEN METABOLIC PANEL: CPT

## 2021-05-23 PROCEDURE — 82248 BILIRUBIN DIRECT: CPT

## 2021-05-23 PROCEDURE — 86038 ANTINUCLEAR ANTIBODIES: CPT

## 2021-05-23 PROCEDURE — 6360000002 HC RX W HCPCS: Performed by: STUDENT IN AN ORGANIZED HEALTH CARE EDUCATION/TRAINING PROGRAM

## 2021-05-23 PROCEDURE — 2580000003 HC RX 258: Performed by: STUDENT IN AN ORGANIZED HEALTH CARE EDUCATION/TRAINING PROGRAM

## 2021-05-23 PROCEDURE — 99232 SBSQ HOSP IP/OBS MODERATE 35: CPT | Performed by: INTERNAL MEDICINE

## 2021-05-23 PROCEDURE — 83874 ASSAY OF MYOGLOBIN: CPT

## 2021-05-23 PROCEDURE — 94760 N-INVAS EAR/PLS OXIMETRY 1: CPT

## 2021-05-23 PROCEDURE — 85025 COMPLETE CBC W/AUTO DIFF WBC: CPT

## 2021-05-23 PROCEDURE — 36415 COLL VENOUS BLD VENIPUNCTURE: CPT

## 2021-05-23 PROCEDURE — 82550 ASSAY OF CK (CPK): CPT

## 2021-05-23 PROCEDURE — 1200000000 HC SEMI PRIVATE

## 2021-05-23 RX ADMIN — HEPARIN SODIUM 5000 UNITS: 5000 INJECTION INTRAVENOUS; SUBCUTANEOUS at 21:34

## 2021-05-23 RX ADMIN — HEPARIN SODIUM 5000 UNITS: 5000 INJECTION INTRAVENOUS; SUBCUTANEOUS at 14:02

## 2021-05-23 RX ADMIN — HEPARIN SODIUM 5000 UNITS: 5000 INJECTION INTRAVENOUS; SUBCUTANEOUS at 06:29

## 2021-05-23 RX ADMIN — SODIUM CHLORIDE: 9 INJECTION, SOLUTION INTRAVENOUS at 06:29

## 2021-05-23 NOTE — PROGRESS NOTES
[facility]  Internal Medicine Teaching Residency Program  Inpatient Daily Progress Note  ______________________________________________________________________________    Patient: Ilya Magana  YOB: 1990   VIS:[id number]    Acct: [de-identified]     Room: [address] [place] [address]-  Admit date: 5/21/2021  Today's date: 05/23/21  Number of days in the hospital: 2    SUBJECTIVE   Admitting Diagnosis: Rhabdomyolysis  CC: Testicular pain with swelling, bilateral pain in thighs and hematuria    Pt examined at bedside. Chart & results reviewed. No acute events overnight  Hemodynamically stable , elevated blood pressure  No new complaints  Pain in thighs resolving. Labs declining CK and myoglobin ,   Making good urine output, fluids decreased to 100 mL/h  Monitor ALT and AST levelsin liver panel which are decreasing   Urology recommends outpatient follow-up for bilateral microlithiasis    ROS:  Constitutional:  negative for chills, fevers, sweats  Respiratory:  negative for cough, dyspnea on exertion, hemoptysis, shortness of breath, wheezing  Cardiovascular:  negative for chest pain, chest pressure/discomfort, lower extremity edema, palpitations  Gastrointestinal:  negative for abdominal pain, constipation, diarrhea, nausea, vomiting  Neurological:  negative for dizziness, headache  BRIEF HISTORY     The patient is a pleasant 35 y.o. male with no significant past medical history presents with a chief complaint of hematuria and testicular pain with swelling.     Patient started noticing pain in the testicles along with swelling, bilateral pain in thighs and also started noticing dark urine since 3 days. Patient indicates that he has been doing workouts over the last 3 days with squats and rest started noticing pain in testicles and hematuria. He has no significant past medical history, denies any trauma.   Patient also indicates he has not eaten anything in the last 2 to 3 days        Initial evaluation in the ER patient was found to be having increased creatinine kinase and myoglobin. Ultrasound scrotum showed bilateral microlithiasis. Urology was consulted and recommends no work-up while being inpatient and recommends to follow as outpatient. Ultrasound scrotum is negative for any mass or torsion.     Patient was found to be having blood pressure of 166/91 on admission decreased to 139/86.     He denies any other complaints such as chest pain, shortness of breath, headache, dizziness, nausea, vomiting,.     Labs showed unremarkable CBC. Elevated liver enzymes, urine tox screen negative  Urinalysis showing large hemoglobinuria    OBJECTIVE     Vital Signs:  BP (!) 161/87   Pulse 70   Temp 98.1 °F (36.7 °C) (Oral)   Resp 19   Ht 6' 2\" (1.88 m)   Wt 263 lb 14.3 oz (119.7 kg)   SpO2 97%   BMI 33.88 kg/m²     Temp (24hrs), Av.1 °F (36.7 °C), Min:98 °F (36.7 °C), Max:98.1 °F (36.7 °C)    In: 2798   Out: -     Physical Exam:  Constitutional: This is a well developed, well nourished, 30-34.9 - Obesity Grade I 35y.o. year old male who is alert, oriented, cooperative and in no apparent distress. Head:normocephalic and atraumatic. EENT:  PERRLA. No conjunctival injections. Septum was midline, mucosa was without erythema, exudates or cobblestoning. No thrush was noted. Neck: Supple without thyromegaly. No elevated JVP. Trachea was midline. Respiratory: Chest was symmetrical without dullness to percussion. Breath sounds bilaterally were clear to auscultation. There were no wheezes, rhonchi or rales. There is no intercostal retraction or use of accessory muscles. No egophony noted. Cardiovascular: Regular without murmur, clicks, gallops or rubs. Abdomen: Slightly rounded and soft without organomegaly. No rebound, rigidity or guarding was appreciated. Lymphatic: No lymphadenopathy. Musculoskeletal: Normal curvature of the spine. No gross muscle weakness. Extremities: Bilateral thigh tenderness resolving. No lower extremity edema, ulcerations, tenderness, varicosities or erythema. Muscle size, tone and strength are normal.  No involuntary movements are noted. Skin:  Warm and dry. Good color, turgor and pigmentation. No lesions or scars. No cyanosis or clubbing  Neurological/Psychiatric: The patient's general behavior, level of consciousness, thought content and emotional status is normal.        Medications:  Scheduled Medications:    sodium chloride flush  5-40 mL Intravenous 2 times per day    heparin (porcine)  5,000 Units Subcutaneous 3 times per day     Continuous Infusions:    sodium chloride      sodium chloride 200 mL/hr at 21 0629     PRN Medicationssodium chloride flush, 5-40 mL, PRN  sodium chloride, 25 mL, PRN  promethazine, 12.5 mg, Q6H PRN   Or  ondansetron, 4 mg, Q6H PRN  polyethylene glycol, 17 g, Daily PRN        Diagnostic Labs:  CBC:   Recent Labs     21  0947 21  1243   WBC 10.1 10.4   RBC 5.71 5.45   HGB 16.5 15.9   HCT 50.0 48.6   MCV 87.6 89.2   RDW 13.1 12.9    See Reflexed IPF Result     BMP:   Recent Labs     21  0947 21  0805 21  0641    139 137   K 4.9 4.6 4.4    105 103   CO2 27 26 26   BUN 8 10 12   CREATININE 0.68* 0.60* 0.61*     BNP: No results for input(s): BNP in the last 72 hours. PT/INR: No results for input(s): PROTIME, INR in the last 72 hours. APTT: No results for input(s): APTT in the last 72 hours. CARDIAC ENZYMES: No results for input(s): CKMB, CKMBINDEX, TROPONINI in the last 72 hours.     Invalid input(s): CKTOTAL;3  FASTING LIPID PANEL:No results found for: CHOL, HDL, TRIG  LIVER PROFILE:   Recent Labs     21  0947 21  0805 21  0641   AST 2,206* 1,099* 437*   * 239* 173*   BILIDIR  --  0.15 0.18   BILITOT 0.84 0.77 0.70   ALKPHOS 72 66 62      MICROBIOLOGY: No results found for: CULTURE    [address]
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
## 2021-05-23 NOTE — PLAN OF CARE
Problem: Falls - Risk of:  Goal: Will remain free from falls  Description: Will remain free from falls  5/23/2021 0307 by Noemy Maddox RN  Outcome: Ongoing  5/23/2021 0307 by Noemy Maddox RN  Outcome: Ongoing  5/22/2021 1809 by Rama Alexandre RN  Outcome: Ongoing  Goal: Absence of physical injury  Description: Absence of physical injury  5/23/2021 0307 by Noemy Maddox RN  Outcome: Ongoing  5/23/2021 0307 by Noemy Maddox RN  Outcome: Ongoing

## 2021-05-24 VITALS
RESPIRATION RATE: 18 BRPM | TEMPERATURE: 99.6 F | OXYGEN SATURATION: 97 % | HEIGHT: 74 IN | WEIGHT: 263.89 LBS | HEART RATE: 68 BPM | SYSTOLIC BLOOD PRESSURE: 144 MMHG | BODY MASS INDEX: 33.87 KG/M2 | DIASTOLIC BLOOD PRESSURE: 86 MMHG

## 2021-05-24 LAB
ALBUMIN SERPL-MCNC: 3.9 G/DL (ref 3.5–5.2)
ALBUMIN/GLOBULIN RATIO: 1.6 (ref 1–2.5)
ALP BLD-CCNC: 65 U/L (ref 40–129)
ALT SERPL-CCNC: 136 U/L (ref 5–41)
ANION GAP SERPL CALCULATED.3IONS-SCNC: 13 MMOL/L (ref 9–17)
ANTI-NUCLEAR ANTIBODY (ANA): NEGATIVE
AST SERPL-CCNC: 187 U/L
BILIRUB SERPL-MCNC: 0.74 MG/DL (ref 0.3–1.2)
BILIRUBIN DIRECT: 0.16 MG/DL
BILIRUBIN, INDIRECT: 0.58 MG/DL (ref 0–1)
BUN BLDV-MCNC: 14 MG/DL (ref 6–20)
CALCIUM SERPL-MCNC: 9.8 MG/DL (ref 8.6–10.4)
CHLORIDE BLD-SCNC: 104 MMOL/L (ref 98–107)
CO2: 25 MMOL/L (ref 20–31)
CREAT SERPL-MCNC: 0.79 MG/DL (ref 0.7–1.2)
GFR AFRICAN AMERICAN: >60 ML/MIN
GFR NON-AFRICAN AMERICAN: >60 ML/MIN
GFR SERPL CREATININE-BSD FRML MDRD: ABNORMAL ML/MIN/{1.73_M2}
GFR SERPL CREATININE-BSD FRML MDRD: ABNORMAL ML/MIN/{1.73_M2}
GLUCOSE BLD-MCNC: 104 MG/DL (ref 70–99)
MYOGLOBIN: 740 NG/ML (ref 28–72)
POTASSIUM SERPL-SCNC: 4.5 MMOL/L (ref 3.7–5.3)
SODIUM BLD-SCNC: 142 MMOL/L (ref 135–144)
TOTAL CK: 3238 U/L (ref 39–308)
TOTAL PROTEIN: 6.4 G/DL (ref 6.4–8.3)

## 2021-05-24 PROCEDURE — 36415 COLL VENOUS BLD VENIPUNCTURE: CPT

## 2021-05-24 PROCEDURE — 80053 COMPREHEN METABOLIC PANEL: CPT

## 2021-05-24 PROCEDURE — 83874 ASSAY OF MYOGLOBIN: CPT

## 2021-05-24 PROCEDURE — 82550 ASSAY OF CK (CPK): CPT

## 2021-05-24 PROCEDURE — 6360000002 HC RX W HCPCS: Performed by: STUDENT IN AN ORGANIZED HEALTH CARE EDUCATION/TRAINING PROGRAM

## 2021-05-24 PROCEDURE — 6370000000 HC RX 637 (ALT 250 FOR IP): Performed by: STUDENT IN AN ORGANIZED HEALTH CARE EDUCATION/TRAINING PROGRAM

## 2021-05-24 PROCEDURE — 2580000003 HC RX 258: Performed by: STUDENT IN AN ORGANIZED HEALTH CARE EDUCATION/TRAINING PROGRAM

## 2021-05-24 PROCEDURE — 99232 SBSQ HOSP IP/OBS MODERATE 35: CPT | Performed by: INTERNAL MEDICINE

## 2021-05-24 PROCEDURE — 82248 BILIRUBIN DIRECT: CPT

## 2021-05-24 RX ORDER — POLYETHYLENE GLYCOL 3350 17 G/17G
17 POWDER, FOR SOLUTION ORAL DAILY
Status: DISCONTINUED | OUTPATIENT
Start: 2021-05-24 | End: 2021-05-24 | Stop reason: HOSPADM

## 2021-05-24 RX ORDER — DOCUSATE SODIUM 100 MG/1
100 CAPSULE, LIQUID FILLED ORAL DAILY
Status: DISCONTINUED | OUTPATIENT
Start: 2021-05-24 | End: 2021-05-24 | Stop reason: HOSPADM

## 2021-05-24 RX ADMIN — SODIUM CHLORIDE: 9 INJECTION, SOLUTION INTRAVENOUS at 05:44

## 2021-05-24 RX ADMIN — POLYETHYLENE GLYCOL 3350 17 G: 17 POWDER, FOR SOLUTION ORAL at 09:21

## 2021-05-24 RX ADMIN — HEPARIN SODIUM 5000 UNITS: 5000 INJECTION INTRAVENOUS; SUBCUTANEOUS at 05:43

## 2021-05-24 NOTE — PROGRESS NOTES
Occupational Therapy Not Seen Note    DATE: 2021  Name: Shay Esposito  : 1990  MRN: [id number]    Patient not available for Occupational Therapy due to:    Pt independent with ADLs and functional tasks at this time.  Pt with no OT acute care needs, will defer OT eval.    Electronically signed by Claude Barbara, OTR/L on 2021 at 2:40 PM

## 2021-05-24 NOTE — PLAN OF CARE
Problem: Falls - Risk of:  Goal: Will remain free from falls  Description: Will remain free from falls  5/24/2021 0321 by Don Harris RN  Outcome: Ongoing  5/24/2021 0321 by Don Harris RN  Outcome: Ongoing  Goal: Absence of physical injury  Description: Absence of physical injury  5/24/2021 0321 by Don Harris RN  Outcome: Ongoing  5/24/2021 0321 by Don Harris RN  Outcome: Ongoing

## 2021-05-24 NOTE — PROGRESS NOTES
[facility]  Internal Medicine Teaching Residency Program  Inpatient Daily Progress Note  ______________________________________________________________________________    Patient: Alline Ormond  YOB: 1990   OJB:[id number]    Acct: [de-identified]     Room: [address] [place] [address]  Admit date: 5/21/2021  Today's date: 05/24/21  Number of days in the hospital: 3    SUBJECTIVE     Admitting Diagnosis: Rhabdomyolysis    CC: Testicular pain with swelling, bilateral pain in thighs and hematuria    Patient seen and examined bedside. Labs and chart reviewed. No acute overnight events. Patient is alert awake and oriented x3. No apparent distress. Denies any complaints at this time. Testicular pain resolved. Hemodynamically stable. CK trended down to 3200s, myoglobin to 740. Continuing fluids at 100 mL/h.    ROS:  Constitutional:  negative for chills, fevers, sweats  Respiratory:  negative for cough, dyspnea on exertion, hemoptysis, shortness of breath, wheezing  Cardiovascular:  negative for chest pain, chest pressure/discomfort, lower extremity edema, palpitations  Gastrointestinal:  negative for abdominal pain, constipation, diarrhea, nausea, vomiting  Neurological:  negative for dizziness, headache    BRIEF HISTORY     The patient is a pleasant 35 y.o. male with no significant past medical history presents with a chief complaint of hematuria and testicular pain with swelling.     Patient started noticing pain in the testicles along with swelling, bilateral pain in thighs and also started noticing dark urine since 3 days. Patient indicates that he has been doing workouts over the last 3 days with squats and rest started noticing pain in testicles and hematuria. He has no significant past medical history, denies any trauma.   Patient also indicates he has not eaten anything in the last 2 to 3 days        Initial evaluation in the ER patient was found to be having increased creatinine kinase and myoglobin. Ultrasound scrotum showed bilateral microlithiasis. Urology was consulted and recommends no work-up while being inpatient and recommends to follow as outpatient. Ultrasound scrotum is negative for any mass or torsion.     Patient was found to be having blood pressure of 166/91 on admission decreased to 139/86.     He denies any other complaints such as chest pain, shortness of breath, headache, dizziness, nausea, vomiting,.     Labs showed unremarkable CBC. Elevated liver enzymes, urine tox screen negative  Urinalysis showing large hemoglobinuria    OBJECTIVE     Vital Signs:  BP (!) 144/86   Pulse 68   Temp 99.6 °F (37.6 °C) (Oral)   Resp 18   Ht 6' 2\" (1.88 m)   Wt 263 lb 14.3 oz (119.7 kg)   SpO2 97%   BMI 33.88 kg/m²     Temp (24hrs), Av °F (37.2 °C), Min:98.3 °F (36.8 °C), Max:99.6 °F (37.6 °C)    In: 1030   Out: -     Physical Exam:  Constitutional: This is a well developed, well nourished, 30-34.9 - Obesity Grade I 35y.o. year old male who is alert, oriented, cooperative and in no apparent distress. Head:normocephalic and atraumatic. EENT:  PERRLA. No conjunctival injections. Septum was midline, mucosa was without erythema, exudates or cobblestoning. No thrush was noted. Neck: Supple without thyromegaly. No elevated JVP. Trachea was midline. Respiratory: Chest was symmetrical without dullness to percussion. Breath sounds bilaterally were clear to auscultation. There were no wheezes, rhonchi or rales. There is no intercostal retraction or use of accessory muscles. No egophony noted. Cardiovascular: Regular without murmur, clicks, gallops or rubs. Abdomen: Slightly rounded and soft without organomegaly. No rebound, rigidity or guarding was appreciated. Lymphatic: No lymphadenopathy. Musculoskeletal: Normal curvature of the spine. No gross muscle weakness. Extremities: Bilateral thigh tenderness resolving.   No lower extremity edema, ulcerations, tenderness, varicosities or erythema. Muscle size, tone and strength are normal.  No involuntary movements are noted. Skin:  Warm and dry. Good color, turgor and pigmentation. No lesions or scars. No cyanosis or clubbing  Neurological/Psychiatric: The patient's general behavior, level of consciousness, thought content and emotional status is normal.        Medications:  Scheduled Medications:    docusate sodium  100 mg Oral Daily    polyethylene glycol  17 g Oral Daily    sodium chloride flush  5-40 mL Intravenous 2 times per day    heparin (porcine)  5,000 Units Subcutaneous 3 times per day     Continuous Infusions:    sodium chloride      sodium chloride 100 mL/hr at 05/24/21 0544     PRN Medicationssodium chloride flush, 5-40 mL, PRN  sodium chloride, 25 mL, PRN  promethazine, 12.5 mg, Q6H PRN   Or  ondansetron, 4 mg, Q6H PRN        Diagnostic Labs:  CBC:   Recent Labs     05/23/21  1620   WBC 8.8   RBC 5.84*   HGB 16.9   HCT 51.1*   MCV 87.5   RDW 12.8        BMP:   Recent Labs     05/22/21  0805 05/23/21  0641 05/24/21  0602    137 142   K 4.6 4.4 4.5    103 104   CO2 26 26 25   BUN 10 12 14   CREATININE 0.60* 0.61* 0.79     BNP: No results for input(s): BNP in the last 72 hours. PT/INR: No results for input(s): PROTIME, INR in the last 72 hours. APTT: No results for input(s): APTT in the last 72 hours. CARDIAC ENZYMES: No results for input(s): CKMB, CKMBINDEX, TROPONINI in the last 72 hours. Invalid input(s): CKTOTAL;3  FASTING LIPID PANEL:No results found for: CHOL, HDL, TRIG  LIVER PROFILE:   Recent Labs     05/22/21  0805 05/23/21  0641 05/24/21  0602   AST 1,099* 437* 187*   * 173* 136*   BILIDIR 0.15 0.18 0.16   BILITOT 0.77 0.70 0.74   ALKPHOS 66 62 65      MICROBIOLOGY: No results found for: CULTURE    Imaging:    US SCROTUM W LIMITED DUPLEX    Result Date: 5/21/2021  No evidence of intratesticular mass or torsion.   However, the patient has bilateral microlithiasis which is associated with an increased lifetime risk of testicular carcinoma. Annual scrotal ultrasound therefore advised. RECOMMENDATIONS: Advise annual ultrasonography of the scrotum. ASSESSMENT & PLAN     ASSESSMENT / PLAN:     Principal Problem:    Rhabdomyolysis  Active Problems:    Transaminitis    Testicular microlithiasis  Resolved Problems:    * No resolved hospital problems. *      Rhabdomyolysis:  -Continue IV fluids until discharge, for few more hours.  -Encourage fluid intake upon discharge.  -Avoid sudden over excessive exertion.  -Repeat BMP, CK and myoglobin in 3 days. Testicular microlithiasis-urology was consulted and recommends outpatient follow-up follow-up with urology on discharge.     Transaminitis secondary to rhabdo-improving.       DVT ppx : Heparin 5000 units  GI ppx: Not indicated    PT/OT: On board  Discharge Planning / SW: Discharge to senior care today. Ling Gil MD  Internal Medicine Resident, PGY- [address] [facility], [place]  5/24/2021, 1:45 PM

## 2021-06-01 NOTE — DISCHARGE SUMMARY
follow-up with themon discharge. Procedures/ Significant Interventions:    none    Consults:     Consults:     Final Specialist Recommendations/Findings:   IP CONSULT TO INTERNAL MEDICINE  IP CONSULT TO CASE MANAGEMENT      Any Hospital Acquired Infections: none    Discharge Functional Status:  stable    DISCHARGE PLAN     Disposition: pt will return to [facility] when discharged    Patient Instructions: There are no discharge medications for this patient. Activity: activity as tolerated    Diet: regular diet    Follow-up:    [facility]  [address] [facility]  [phone]    With urology, please call to schedule if you are not contacted       Patient Instructions:     · Encourage oral fluid intake for at least next 3 to 4 days. · We will repeat BMP, CK and myoglobin in 3 days. · Follow-up with urology as outpatient in 4 to 6 weeks. Urology: Patient needs to follow up outpatient with urology for testicular pain. Please call office to schedule appointment. Use NSAIDs as needed for pain. Elevated and ice as needed. Follow up labs: BMP, CK and Myoglobin   Follow up imaging: none    Raciel Weiner MD,   Internal Medicine Resident, PGY- [facility];  [place], [place]  6/1/2021, 6:27 PM

## 2024-04-13 ENCOUNTER — HOSPITAL ENCOUNTER (EMERGENCY)
Facility: HOSPITAL | Age: 34
Discharge: HOME | End: 2024-04-13
Attending: EMERGENCY MEDICINE
Payer: COMMERCIAL

## 2024-04-13 ENCOUNTER — APPOINTMENT (OUTPATIENT)
Dept: RADIOLOGY | Facility: HOSPITAL | Age: 34
End: 2024-04-13
Payer: COMMERCIAL

## 2024-04-13 VITALS
BODY MASS INDEX: 23.1 KG/M2 | HEIGHT: 74 IN | OXYGEN SATURATION: 100 % | RESPIRATION RATE: 18 BRPM | DIASTOLIC BLOOD PRESSURE: 83 MMHG | WEIGHT: 180 LBS | HEART RATE: 71 BPM | TEMPERATURE: 98.6 F | SYSTOLIC BLOOD PRESSURE: 127 MMHG

## 2024-04-13 DIAGNOSIS — S02.609B: Primary | ICD-10-CM

## 2024-04-13 DIAGNOSIS — S02.2XXA CLOSED FRACTURE OF NASAL BONE, INITIAL ENCOUNTER: ICD-10-CM

## 2024-04-13 PROCEDURE — 2500000004 HC RX 250 GENERAL PHARMACY W/ HCPCS (ALT 636 FOR OP/ED): Performed by: NURSE PRACTITIONER

## 2024-04-13 PROCEDURE — 76377 3D RENDER W/INTRP POSTPROCES: CPT | Performed by: RADIOLOGY

## 2024-04-13 PROCEDURE — 70486 CT MAXILLOFACIAL W/O DYE: CPT

## 2024-04-13 PROCEDURE — 99285 EMERGENCY DEPT VISIT HI MDM: CPT | Mod: 25

## 2024-04-13 PROCEDURE — 96372 THER/PROPH/DIAG INJ SC/IM: CPT | Performed by: NURSE PRACTITIONER

## 2024-04-13 PROCEDURE — 2500000001 HC RX 250 WO HCPCS SELF ADMINISTERED DRUGS (ALT 637 FOR MEDICARE OP): Performed by: NURSE PRACTITIONER

## 2024-04-13 PROCEDURE — 76377 3D RENDER W/INTRP POSTPROCES: CPT

## 2024-04-13 PROCEDURE — A4217 STERILE WATER/SALINE, 500 ML: HCPCS | Performed by: NURSE PRACTITIONER

## 2024-04-13 PROCEDURE — 70486 CT MAXILLOFACIAL W/O DYE: CPT | Performed by: RADIOLOGY

## 2024-04-13 RX ORDER — AMOXICILLIN AND CLAVULANATE POTASSIUM 875; 125 MG/1; MG/1
875 TABLET, FILM COATED ORAL EVERY 12 HOURS
Qty: 14 TABLET | Refills: 0 | Status: SHIPPED | OUTPATIENT
Start: 2024-04-13 | End: 2024-04-22 | Stop reason: HOSPADM

## 2024-04-13 RX ORDER — AMOXICILLIN AND CLAVULANATE POTASSIUM 600; 42.9 MG/5ML; MG/5ML
875 POWDER, FOR SUSPENSION ORAL ONCE
Status: COMPLETED | OUTPATIENT
Start: 2024-04-13 | End: 2024-04-13

## 2024-04-13 RX ORDER — NAPROXEN 500 MG/1
500 TABLET ORAL
Qty: 20 TABLET | Refills: 0 | Status: ON HOLD | OUTPATIENT
Start: 2024-04-13 | End: 2024-04-22 | Stop reason: ALTCHOICE

## 2024-04-13 RX ORDER — FENTANYL CITRATE 50 UG/ML
25 INJECTION, SOLUTION INTRAMUSCULAR; INTRAVENOUS ONCE
Status: COMPLETED | OUTPATIENT
Start: 2024-04-13 | End: 2024-04-13

## 2024-04-13 RX ADMIN — AMOXICILLIN AND CLAVULANATE POTASSIUM 875 MG: 600; 42.9 POWDER, FOR SUSPENSION ORAL at 18:42

## 2024-04-13 RX ADMIN — FENTANYL CITRATE 25 MCG: 50 INJECTION INTRAMUSCULAR; INTRAVENOUS at 18:47

## 2024-04-13 ASSESSMENT — COLUMBIA-SUICIDE SEVERITY RATING SCALE - C-SSRS
2. HAVE YOU ACTUALLY HAD ANY THOUGHTS OF KILLING YOURSELF?: NO
6. HAVE YOU EVER DONE ANYTHING, STARTED TO DO ANYTHING, OR PREPARED TO DO ANYTHING TO END YOUR LIFE?: NO
1. IN THE PAST MONTH, HAVE YOU WISHED YOU WERE DEAD OR WISHED YOU COULD GO TO SLEEP AND NOT WAKE UP?: NO

## 2024-04-13 ASSESSMENT — PAIN SCALES - GENERAL
PAINLEVEL_OUTOF10: 10 - WORST POSSIBLE PAIN
PAINLEVEL_OUTOF10: 10 - WORST POSSIBLE PAIN

## 2024-04-13 ASSESSMENT — PAIN - FUNCTIONAL ASSESSMENT
PAIN_FUNCTIONAL_ASSESSMENT: 0-10
PAIN_FUNCTIONAL_ASSESSMENT: 0-10

## 2024-04-13 ASSESSMENT — PAIN DESCRIPTION - LOCATION: LOCATION: FACE

## 2024-04-13 ASSESSMENT — PAIN DESCRIPTION - FREQUENCY: FREQUENCY: CONSTANT/CONTINUOUS

## 2024-04-13 ASSESSMENT — PAIN DESCRIPTION - ORIENTATION: ORIENTATION: RIGHT
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
## 2024-04-17 ENCOUNTER — APPOINTMENT (OUTPATIENT)
Dept: ORTHOPEDIC SURGERY | Facility: HOSPITAL | Age: 34
End: 2024-04-17
Payer: COMMERCIAL

## 2024-04-18 DIAGNOSIS — S02.601B OPEN FRACTURE OF RIGHT SIDE OF MANDIBULAR BODY, INITIAL ENCOUNTER (MULTI): Primary | ICD-10-CM

## 2024-04-19 PROBLEM — S02.601B: Status: ACTIVE | Noted: 2024-04-18

## 2024-04-22 ENCOUNTER — ANESTHESIA EVENT (OUTPATIENT)
Dept: OPERATING ROOM | Facility: HOSPITAL | Age: 34
End: 2024-04-22
Payer: MEDICAID

## 2024-04-22 ENCOUNTER — APPOINTMENT (OUTPATIENT)
Dept: RADIOLOGY | Facility: HOSPITAL | Age: 34
End: 2024-04-22
Payer: MEDICAID

## 2024-04-22 ENCOUNTER — ANESTHESIA (OUTPATIENT)
Dept: OPERATING ROOM | Facility: HOSPITAL | Age: 34
End: 2024-04-22
Payer: MEDICAID

## 2024-04-22 ENCOUNTER — HOSPITAL ENCOUNTER (OUTPATIENT)
Facility: HOSPITAL | Age: 34
Setting detail: OUTPATIENT SURGERY
Discharge: HOME | End: 2024-04-22
Attending: DENTIST | Admitting: DENTIST
Payer: MEDICAID

## 2024-04-22 VITALS
SYSTOLIC BLOOD PRESSURE: 135 MMHG | OXYGEN SATURATION: 95 % | TEMPERATURE: 97.5 F | WEIGHT: 193.12 LBS | HEART RATE: 61 BPM | DIASTOLIC BLOOD PRESSURE: 88 MMHG | HEIGHT: 74 IN | RESPIRATION RATE: 18 BRPM | BODY MASS INDEX: 24.78 KG/M2

## 2024-04-22 DIAGNOSIS — S02.601B OPEN FRACTURE OF RIGHT SIDE OF MANDIBULAR BODY, INITIAL ENCOUNTER (MULTI): Primary | ICD-10-CM

## 2024-04-22 PROCEDURE — 36415 COLL VENOUS BLD VENIPUNCTURE: CPT | Performed by: STUDENT IN AN ORGANIZED HEALTH CARE EDUCATION/TRAINING PROGRAM

## 2024-04-22 PROCEDURE — 3700000002 HC GENERAL ANESTHESIA TIME - EACH INCREMENTAL 1 MINUTE: Performed by: DENTIST

## 2024-04-22 PROCEDURE — 2500000004 HC RX 250 GENERAL PHARMACY W/ HCPCS (ALT 636 FOR OP/ED)

## 2024-04-22 PROCEDURE — 2500000005 HC RX 250 GENERAL PHARMACY W/O HCPCS

## 2024-04-22 PROCEDURE — 2500000005 HC RX 250 GENERAL PHARMACY W/O HCPCS: Performed by: DENTIST

## 2024-04-22 PROCEDURE — A21462 PR OPEN RX MANDIBLE FX+DENTAL FIX: Performed by: STUDENT IN AN ORGANIZED HEALTH CARE EDUCATION/TRAINING PROGRAM

## 2024-04-22 PROCEDURE — 7100000002 HC RECOVERY ROOM TIME - EACH INCREMENTAL 1 MINUTE: Performed by: DENTIST

## 2024-04-22 PROCEDURE — C1713 ANCHOR/SCREW BN/BN,TIS/BN: HCPCS | Performed by: DENTIST

## 2024-04-22 PROCEDURE — 2780000003 HC OR 278 NO HCPCS: Performed by: DENTIST

## 2024-04-22 PROCEDURE — 7100000010 HC PHASE TWO TIME - EACH INCREMENTAL 1 MINUTE: Performed by: DENTIST

## 2024-04-22 PROCEDURE — 70355 PANORAMIC X-RAY OF JAWS: CPT

## 2024-04-22 PROCEDURE — 3600000003 HC OR TIME - INITIAL BASE CHARGE - PROCEDURE LEVEL THREE: Performed by: DENTIST

## 2024-04-22 PROCEDURE — 3700000001 HC GENERAL ANESTHESIA TIME - INITIAL BASE CHARGE: Performed by: DENTIST

## 2024-04-22 PROCEDURE — 2720000007 HC OR 272 NO HCPCS: Performed by: DENTIST

## 2024-04-22 PROCEDURE — 7100000001 HC RECOVERY ROOM TIME - INITIAL BASE CHARGE: Performed by: DENTIST

## 2024-04-22 PROCEDURE — 70355 PANORAMIC X-RAY OF JAWS: CPT | Performed by: RADIOLOGY

## 2024-04-22 PROCEDURE — 7100000009 HC PHASE TWO TIME - INITIAL BASE CHARGE: Performed by: DENTIST

## 2024-04-22 PROCEDURE — 3600000008 HC OR TIME - EACH INCREMENTAL 1 MINUTE - PROCEDURE LEVEL THREE: Performed by: DENTIST

## 2024-04-22 DEVICE — PLATE, RECON 1.5 11H GOLD: Type: IMPLANTABLE DEVICE | Site: MOUTH | Status: FUNCTIONAL

## 2024-04-22 DEVICE — IMPLANTABLE DEVICE: Type: IMPLANTABLE DEVICE | Site: MOUTH | Status: FUNCTIONAL

## 2024-04-22 DEVICE — SCREW, 12MM MMF: Type: IMPLANTABLE DEVICE | Site: MOUTH | Status: FUNCTIONAL

## 2024-04-22 DEVICE — SCREW, 8MM MMF: Type: IMPLANTABLE DEVICE | Site: MOUTH | Status: FUNCTIONAL

## 2024-04-22 DEVICE — PLATE, MINI, 4H: Type: IMPLANTABLE DEVICE | Site: MOUTH | Status: FUNCTIONAL

## 2024-04-22 DEVICE — SCREW, 2 X 5 CROSS PIN: Type: IMPLANTABLE DEVICE | Site: MOUTH | Status: FUNCTIONAL

## 2024-04-22 RX ORDER — HYDROMORPHONE HYDROCHLORIDE 1 MG/ML
INJECTION, SOLUTION INTRAMUSCULAR; INTRAVENOUS; SUBCUTANEOUS AS NEEDED
Status: DISCONTINUED | OUTPATIENT
Start: 2024-04-22 | End: 2024-04-22

## 2024-04-22 RX ORDER — ONDANSETRON HYDROCHLORIDE 2 MG/ML
4 INJECTION, SOLUTION INTRAVENOUS ONCE AS NEEDED
Status: DISCONTINUED | OUTPATIENT
Start: 2024-04-22 | End: 2024-04-22 | Stop reason: HOSPADM

## 2024-04-22 RX ORDER — SODIUM CHLORIDE, SODIUM LACTATE, POTASSIUM CHLORIDE, CALCIUM CHLORIDE 600; 310; 30; 20 MG/100ML; MG/100ML; MG/100ML; MG/100ML
100 INJECTION, SOLUTION INTRAVENOUS CONTINUOUS
Status: DISCONTINUED | OUTPATIENT
Start: 2024-04-22 | End: 2024-04-22 | Stop reason: HOSPADM

## 2024-04-22 RX ORDER — PROPOFOL 10 MG/ML
INJECTION, EMULSION INTRAVENOUS AS NEEDED
Status: DISCONTINUED | OUTPATIENT
Start: 2024-04-22 | End: 2024-04-22

## 2024-04-22 RX ORDER — ACETAMINOPHEN 500 MG
500 TABLET ORAL EVERY 6 HOURS PRN
Qty: 28 TABLET | Refills: 0 | Status: SHIPPED | OUTPATIENT
Start: 2024-04-22 | End: 2024-04-29

## 2024-04-22 RX ORDER — FENTANYL CITRATE 50 UG/ML
INJECTION, SOLUTION INTRAMUSCULAR; INTRAVENOUS AS NEEDED
Status: DISCONTINUED | OUTPATIENT
Start: 2024-04-22 | End: 2024-04-22

## 2024-04-22 RX ORDER — ACETAMINOPHEN 325 MG/1
650 TABLET ORAL EVERY 4 HOURS PRN
Status: DISCONTINUED | OUTPATIENT
Start: 2024-04-22 | End: 2024-04-22 | Stop reason: HOSPADM

## 2024-04-22 RX ORDER — CHLORHEXIDINE GLUCONATE ORAL RINSE 1.2 MG/ML
15 SOLUTION DENTAL 2 TIMES DAILY
Qty: 120 ML | Refills: 0 | Status: SHIPPED | OUTPATIENT
Start: 2024-04-22

## 2024-04-22 RX ORDER — SODIUM CHLORIDE, SODIUM LACTATE, POTASSIUM CHLORIDE, CALCIUM CHLORIDE 600; 310; 30; 20 MG/100ML; MG/100ML; MG/100ML; MG/100ML
INJECTION, SOLUTION INTRAVENOUS CONTINUOUS PRN
Status: DISCONTINUED | OUTPATIENT
Start: 2024-04-22 | End: 2024-04-22

## 2024-04-22 RX ORDER — LIDOCAINE HYDROCHLORIDE AND EPINEPHRINE 10; 10 MG/ML; UG/ML
INJECTION, SOLUTION INFILTRATION; PERINEURAL AS NEEDED
Status: DISCONTINUED | OUTPATIENT
Start: 2024-04-22 | End: 2024-04-22 | Stop reason: HOSPADM

## 2024-04-22 RX ORDER — DROPERIDOL 2.5 MG/ML
0.62 INJECTION, SOLUTION INTRAMUSCULAR; INTRAVENOUS ONCE AS NEEDED
Status: DISCONTINUED | OUTPATIENT
Start: 2024-04-22 | End: 2024-04-22 | Stop reason: HOSPADM

## 2024-04-22 RX ORDER — ALBUTEROL SULFATE 0.83 MG/ML
2.5 SOLUTION RESPIRATORY (INHALATION) ONCE
Status: DISCONTINUED | OUTPATIENT
Start: 2024-04-22 | End: 2024-04-22 | Stop reason: HOSPADM

## 2024-04-22 RX ORDER — DEXMEDETOMIDINE HYDROCHLORIDE 4 UG/ML
INJECTION, SOLUTION INTRAVENOUS CONTINUOUS PRN
Status: DISCONTINUED | OUTPATIENT
Start: 2024-04-22 | End: 2024-04-22

## 2024-04-22 RX ORDER — HYDROMORPHONE HYDROCHLORIDE 1 MG/ML
0.2 INJECTION, SOLUTION INTRAMUSCULAR; INTRAVENOUS; SUBCUTANEOUS EVERY 5 MIN PRN
Status: DISCONTINUED | OUTPATIENT
Start: 2024-04-22 | End: 2024-04-22 | Stop reason: HOSPADM

## 2024-04-22 RX ORDER — AMOXICILLIN AND CLAVULANATE POTASSIUM 875; 125 MG/1; MG/1
1 TABLET, FILM COATED ORAL 2 TIMES DAILY
Qty: 14 TABLET | Refills: 0 | Status: SHIPPED | OUTPATIENT
Start: 2024-04-22 | End: 2024-04-29

## 2024-04-22 RX ORDER — ONDANSETRON HYDROCHLORIDE 2 MG/ML
INJECTION, SOLUTION INTRAVENOUS AS NEEDED
Status: DISCONTINUED | OUTPATIENT
Start: 2024-04-22 | End: 2024-04-22

## 2024-04-22 RX ORDER — CEFAZOLIN 1 G/1
INJECTION, POWDER, FOR SOLUTION INTRAVENOUS AS NEEDED
Status: DISCONTINUED | OUTPATIENT
Start: 2024-04-22 | End: 2024-04-22

## 2024-04-22 RX ORDER — LIDOCAINE HYDROCHLORIDE 10 MG/ML
0.1 INJECTION INFILTRATION; PERINEURAL ONCE
Status: DISCONTINUED | OUTPATIENT
Start: 2024-04-22 | End: 2024-04-22 | Stop reason: HOSPADM

## 2024-04-22 RX ORDER — LABETALOL HYDROCHLORIDE 5 MG/ML
5 INJECTION, SOLUTION INTRAVENOUS ONCE AS NEEDED
Status: DISCONTINUED | OUTPATIENT
Start: 2024-04-22 | End: 2024-04-22 | Stop reason: HOSPADM

## 2024-04-22 RX ORDER — ALBUTEROL SULFATE 0.83 MG/ML
2.5 SOLUTION RESPIRATORY (INHALATION) ONCE AS NEEDED
Status: DISCONTINUED | OUTPATIENT
Start: 2024-04-22 | End: 2024-04-22 | Stop reason: HOSPADM

## 2024-04-22 RX ORDER — HYDROMORPHONE HYDROCHLORIDE 1 MG/ML
0.5 INJECTION, SOLUTION INTRAMUSCULAR; INTRAVENOUS; SUBCUTANEOUS EVERY 5 MIN PRN
Status: DISCONTINUED | OUTPATIENT
Start: 2024-04-22 | End: 2024-04-22 | Stop reason: HOSPADM

## 2024-04-22 RX ORDER — ROCURONIUM BROMIDE 10 MG/ML
INJECTION, SOLUTION INTRAVENOUS AS NEEDED
Status: DISCONTINUED | OUTPATIENT
Start: 2024-04-22 | End: 2024-04-22

## 2024-04-22 RX ORDER — MIDAZOLAM HYDROCHLORIDE 5 MG/ML
INJECTION, SOLUTION INTRAMUSCULAR; INTRAVENOUS AS NEEDED
Status: DISCONTINUED | OUTPATIENT
Start: 2024-04-22 | End: 2024-04-22

## 2024-04-22 RX ORDER — IBUPROFEN 600 MG/1
600 TABLET ORAL EVERY 6 HOURS PRN
Qty: 28 TABLET | Refills: 0 | Status: SHIPPED | OUTPATIENT
Start: 2024-04-22 | End: 2024-04-29

## 2024-04-22 RX ADMIN — Medication 50 MG: at 13:17

## 2024-04-22 RX ADMIN — FENTANYL CITRATE 50 MCG: 50 INJECTION, SOLUTION INTRAMUSCULAR; INTRAVENOUS at 13:17

## 2024-04-22 RX ADMIN — HYDROMORPHONE HYDROCHLORIDE 0.5 MG: 1 INJECTION, SOLUTION INTRAMUSCULAR; INTRAVENOUS; SUBCUTANEOUS at 15:48

## 2024-04-22 RX ADMIN — DEXAMETHASONE SODIUM PHOSPHATE 10 MG: 4 INJECTION INTRA-ARTICULAR; INTRALESIONAL; INTRAMUSCULAR; INTRAVENOUS; SOFT TISSUE at 13:27

## 2024-04-22 RX ADMIN — ROCURONIUM BROMIDE 100 MG: 10 INJECTION INTRAVENOUS at 13:17

## 2024-04-22 RX ADMIN — DEXMEDETOMIDINE HYDROCHLORIDE 0.5 MCG/KG/HR: 4 INJECTION, SOLUTION INTRAVENOUS at 13:23

## 2024-04-22 RX ADMIN — PROPOFOL 200 MG: 10 INJECTION, EMULSION INTRAVENOUS at 13:24

## 2024-04-22 RX ADMIN — PROPOFOL 200 MG: 10 INJECTION, EMULSION INTRAVENOUS at 13:17

## 2024-04-22 RX ADMIN — HYDROMORPHONE HYDROCHLORIDE 1 MG: 1 INJECTION, SOLUTION INTRAMUSCULAR; INTRAVENOUS; SUBCUTANEOUS at 14:48

## 2024-04-22 RX ADMIN — SODIUM CHLORIDE, POTASSIUM CHLORIDE, SODIUM LACTATE AND CALCIUM CHLORIDE: 600; 310; 30; 20 INJECTION, SOLUTION INTRAVENOUS at 13:03

## 2024-04-22 RX ADMIN — ROCURONIUM BROMIDE 20 MG: 10 INJECTION INTRAVENOUS at 14:06

## 2024-04-22 RX ADMIN — MIDAZOLAM HYDROCHLORIDE 3 MG: 5 INJECTION, SOLUTION INTRAMUSCULAR; INTRAVENOUS at 13:03

## 2024-04-22 RX ADMIN — MIDAZOLAM HYDROCHLORIDE 3 MG: 5 INJECTION, SOLUTION INTRAMUSCULAR; INTRAVENOUS at 13:17

## 2024-04-22 RX ADMIN — SUGAMMADEX 200 MG: 100 INJECTION, SOLUTION INTRAVENOUS at 15:01

## 2024-04-22 RX ADMIN — ONDANSETRON 4 MG: 2 INJECTION INTRAMUSCULAR; INTRAVENOUS at 14:34

## 2024-04-22 RX ADMIN — FENTANYL CITRATE 50 MCG: 50 INJECTION, SOLUTION INTRAMUSCULAR; INTRAVENOUS at 13:46

## 2024-04-22 RX ADMIN — HYDROMORPHONE HYDROCHLORIDE 0.5 MG: 1 INJECTION, SOLUTION INTRAMUSCULAR; INTRAVENOUS; SUBCUTANEOUS at 16:11

## 2024-04-22 RX ADMIN — HYDROMORPHONE HYDROCHLORIDE 0.4 MG: 1 INJECTION, SOLUTION INTRAMUSCULAR; INTRAVENOUS; SUBCUTANEOUS at 14:40

## 2024-04-22 RX ADMIN — MIDAZOLAM HYDROCHLORIDE 4 MG: 5 INJECTION, SOLUTION INTRAMUSCULAR; INTRAVENOUS at 13:10

## 2024-04-22 RX ADMIN — CEFAZOLIN 2 G: 1 INJECTION, POWDER, FOR SOLUTION INTRAMUSCULAR; INTRAVENOUS at 13:27

## 2024-04-22 RX ADMIN — HYDROMORPHONE HYDROCHLORIDE 0.6 MG: 1 INJECTION, SOLUTION INTRAMUSCULAR; INTRAVENOUS; SUBCUTANEOUS at 14:36

## 2024-04-22 SDOH — HEALTH STABILITY: MENTAL HEALTH: CURRENT SMOKER: 0

## 2024-04-22 ASSESSMENT — PAIN - FUNCTIONAL ASSESSMENT
PAIN_FUNCTIONAL_ASSESSMENT: 0-10
PAIN_FUNCTIONAL_ASSESSMENT: UNABLE TO SELF-REPORT
PAIN_FUNCTIONAL_ASSESSMENT: 0-10

## 2024-04-22 ASSESSMENT — PAIN SCALES - GENERAL
PAINLEVEL_OUTOF10: 0 - NO PAIN
PAINLEVEL_OUTOF10: 0 - NO PAIN
PAINLEVEL_OUTOF10: 7
PAINLEVEL_OUTOF10: 8
PAINLEVEL_OUTOF10: 7
PAINLEVEL_OUTOF10: 8
PAINLEVEL_OUTOF10: 8
PAINLEVEL_OUTOF10: 7

## 2024-04-22 ASSESSMENT — COLUMBIA-SUICIDE SEVERITY RATING SCALE - C-SSRS
1. IN THE PAST MONTH, HAVE YOU WISHED YOU WERE DEAD OR WISHED YOU COULD GO TO SLEEP AND NOT WAKE UP?: NO
6. HAVE YOU EVER DONE ANYTHING, STARTED TO DO ANYTHING, OR PREPARED TO DO ANYTHING TO END YOUR LIFE?: NO
2. HAVE YOU ACTUALLY HAD ANY THOUGHTS OF KILLING YOURSELF?: NO

## 2024-04-22 NOTE — ANESTHESIA PREPROCEDURE EVALUATION
Patient: Adiel Loco    Procedure Information       Date/Time: 04/22/24 1239    Procedure: Open Reduction Internal Fixation Mandible (Right)    Location: [facility] OR 22 / [facility] OR    Surgeons: Bishnu Hein DMD            Relevant Problems   Cardiac (within normal limits)      Pulmonary (within normal limits)      Neuro (within normal limits)      GI (within normal limits)      /Renal (within normal limits)      Liver (within normal limits)      Endocrine (within normal limits)      Hematology (within normal limits)      Musculoskeletal (within normal limits)   (+) Open fracture of right side of mandibular body (Multi)      HEENT (within normal limits)      ID (within normal limits)      Skin (within normal limits)      GYN (within normal limits)       Clinical information reviewed:   Tobacco  Allergies  Meds   Med Hx  Surg Hx   Fam Hx  Soc Hx        NPO Detail:  NPO/Void Status  Carbohydrate Drink Given Prior to Surgery? : N  Date of Last Liquid: 04/21/24  Time of Last Liquid: 1700  Date of Last Solid: 04/21/24  Time of Last Solid: 1700  Last Intake Type: Clear fluids  Time of Last Void: 1000         Physical Exam    Airway  Mallampati: II  TM distance: >3 FB  Neck ROM: full     Cardiovascular    Dental   Comments: Right upper all and right lower all teeth loose due to jaw fracture   Pulmonary    Abdominal            Anesthesia Plan    History of general anesthesia?: no  History of complications of general anesthesia?: unknown/emergency    ASA 1     general     The patient is not a current smoker.    intravenous induction   Postoperative administration of opioids is intended.  Trial extubation is planned.  Anesthetic plan and risks discussed with patient.  Use of blood products discussed with patient who consented to blood products.    Plan discussed with resident.

## 2024-04-22 NOTE — OP NOTE
Open Reduction Internal Fixation Mandible (R) Operative Note     Date: 2024  OR Location: [facility] OR    Name: Adiel Loco, : 1990, Age: 33 y.o., MRN: [id number], Sex: male    Diagnosis  Pre-op Diagnosis     * Open fracture of right side of mandibular body, initial encounter (Multi) [S02.601B] Post-op Diagnosis     * Open fracture of right side of mandibular body, initial encounter (Multi) [S02.601B]     Procedures  Open Reduction Internal Fixation Mandible  61526 - [place] OPEN TX MANDIBULAR FX W/INTERDENTAL FIXATION      Surgeons      * Bishnu Hein - Primary    Resident/Fellow/Other Assistant:  Surgeons and Role:     * Chriss Gudino DDS - Resident - Assisting     * Mame Berger DDS - Resident - Assisting    Procedure Summary  Anesthesia: General  ASA: I  Anesthesia Staff: Anesthesiologist: Jerod Garay DO  C-AA: AMERICO Howard  Anesthesia Resident: Rachel Garza MD  Estimated Blood Loss: 10 mL  Intra-op Medications:   Administrations occurring from 1239 to 1539 on 24:   Medication Name Total Dose   lidocaine-epinephrine (Xylocaine W/EPI) 1 %-1:100,000 injection 6 mL              Anesthesia Record               Intraprocedure I/O Totals          Intake    Dexmedetomidine 0.00 mL    The total shown is the total volume documented since Anesthesia Start was filed.    Total Intake 0 mL          Specimen: No specimens collected     Staff:   Circulator: Erica Galeano RN; Ryan Simpson RN  Scrub Person: Azul Braun; Mena Rodriguez          Implants:  Implants       Type Name Action Serial No.      Screw PLATE, RECON 1.5 11H GOLD - [id number] Implanted      Screw SCREW, 2 X 5 CROSS PIN - [id number] Implanted      Screw SCREW, 2 X 14 CROSS PIN - [id number] Implanted      Screw SCREW, 8MM MMF - [id number] Implanted      Screw SCREW, 12MM MMF - [id number] Implanted      Screw PLATE, MINI, 4H - [id number] Implanted               Findings: Right parasymphysis fracture     Indications:  Adiel Loco is an 33 y.o. male who is having surgery for Open fracture of right side of mandibular body, initial encounter (Multi) [S02.601B].     The patient was seen in the preoperative area. The risks, benefits, complications, treatment options, non-operative alternatives, expected recovery and outcomes were discussed with the patient. The possibilities of reaction to medication, pulmonary aspiration, injury to surrounding structures, bleeding, recurrent infection, the need for additional procedures, failure to diagnose a condition, and creating a complication requiring transfusion or operation were discussed with the patient. The patient concurred with the proposed plan, giving informed consent.  The site of surgery was properly noted/marked if necessary per policy. The patient has been actively warmed in preoperative area. Preoperative antibiotics have been ordered and given within 1 hours of incision. Venous thrombosis prophylaxis have been ordered including bilateral sequential compression devices    Procedure Details:     The patient was greeted in the pre-op holding area, where all preoperative risks and complications were reviewed. Later, the patient was brought into the operating room by the anesthesia staff and was placed in the supine position. A timeout was performed to confirmed Patient's identity and the procedure to be performed. The patient was then induced for general anesthesia and intubated with a nasal endotracheal tube. Following intubation, the nasal  endotracheal tube was secured by the oral and maxillofacial surgery team. The patient was prepped and draped in standard oral and maxillofacial surgery fashion.  A throat pack was placed in the posterior oropharynx. Approx. 10cc 1% lidocaine with 1:100K epi was administered via vestibular  infiltration at the planned incision sites. A preincision pause was performed. Additional ~5cc was then used to infiltration buccal mucosa of the mandible  and maxilla intraorally. IMF screws were placed, 2 in maxilla and 2 in mandible. occlusion was manipulated into placed 25 gauge wire was used to place patient in IMF. Occlusion was secured.    7 cc of 1% Lidocaine w/ 1:100k epic was administered via anterior mandibular infiltration. A vestibular incision was made in the mandibular vestibule extending from right molar to left canine using electrocautery, incision was carried through periosteum down  to bone. A #9 periosteal elevator was then used to continue the dissection subperiosteally to expose the Fracture sites and inferior border of mandible. Dissection was then carefully carried posteriorly on right to expose the mental nerve on the right side and noted to be intact.    Occlusion was manipulated into placed while simultaneously repositioning the fractured right segments to allow proper occlusion. 25 gauge wire was used to place patient in IMF. Occlusion was secured. A E-nterview drill used to make 2 superficial holes on either side of fracture line, and a bone reduction clamp used to reapproximate the displaced segments to appropriate reduction.     Inferior border plate was then adapted to mandible and bicortical screw holes were drilled and appropriate length screws used to secure plate. The bone reduction clamp was removed. Superior border plate adapted to mandible and monocortical screw holes drilled and secured in place with monocortical screws.  IMF wires were then cut and removed and occlusion noted to be stable and reproducible. All IMF screws removed. Incision was closed with 3-0 vicryl sutures, with carefully re approximating the mentalis muscle. Throat pack was removed. OG tube was passed and care of the patient was then transferred over to the anesthesia team. The patient was emerged from anesthesia, extubated and was taken to PACU in stable condition.      Complications:  None; patient tolerated the procedure well.    Disposition: PACU -  hemodynamically stable.  Condition: stable   Attending Attestation:

## 2024-04-22 NOTE — ANESTHESIA POSTPROCEDURE EVALUATION
Patient: Adiel Loco    Procedure Summary       Date: 04/22/24 Room / Location: [facility] OR 22 / [facility]    Anesthesia Start: 1303 Anesthesia Stop: 1522    Procedure: Open Reduction Internal Fixation Mandible (Right) Diagnosis:       Open fracture of right side of mandibular body, initial encounter (Multi)      (Open fracture of right side of mandibular body, initial encounter (Multi) [S02.601B])    Surgeons: Bishnu Hein DMD Responsible Provider: Jerod Garay DO    Anesthesia Type: general ASA Status: 1            Anesthesia Type: general    Vitals Value Taken Time   /81 04/22/24 1514   Temp 36.0 04/22/24 1522   Pulse 62 04/22/24 1520   Resp 16 04/22/24 1520   SpO2 96 % 04/22/24 1520   Vitals shown include unfiled device data.    Anesthesia Post Evaluation    Patient location during evaluation: PACU  Patient participation: complete - patient participated  Level of consciousness: awake and alert  Pain management: adequate  Multimodal analgesia pain management approach  Airway patency: patent  Two or more strategies used to mitigate risk of obstructive sleep apnea  Cardiovascular status: blood pressure returned to baseline and acceptable  Respiratory status: acceptable  Hydration status: acceptable  Postoperative Nausea and Vomiting: none        No notable events documented.

## 2024-04-22 NOTE — ANESTHESIA PROCEDURE NOTES
Peripheral IV  Date/Time: 4/22/2024 1:00 PM      Placement  Needle size: 20 G  Laterality: left  Location: hand  Site prep: alcohol  Attempts: 1

## 2024-04-22 NOTE — ANESTHESIA PROCEDURE NOTES
Airway  Date/Time: 4/22/2024 1:22 PM  Urgency: elective    Airway not difficult    Staffing  Performed: resident   Authorized by: Jerod Garay DO    Performed by: Rachel Garza MD  Patient location during procedure: OR    Indications and Patient Condition  Indications for airway management: anesthesia  Spontaneous ventilation: present  Sedation level: deep  Preoxygenated: yes  MILS maintained throughout  Mask difficulty assessment: 1 - vent by mask  Planned trial extubation    Final Airway Details  Final airway type: endotracheal airway      Successful airway: SUNNY tube and ETT  Cuffed: yes   Successful intubation technique: direct laryngoscopy  Endotracheal tube insertion site: left naris  Blade: Elham  Blade size: #4  Cormack-Lehane Classification: grade I - full view of glottis  Placement verified by: chest auscultation   Measured from: lips  ETT to lips (cm): 22  Number of attempts at approach: 1  Ventilation between attempts: none
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
## 2024-04-22 NOTE — BRIEF OP NOTE
Date: 2024  OR Location: [facility] OR    Name: Adiel Loco, : 1990, Age: 33 y.o., MRN: [id number], Sex: male    Diagnosis  Pre-op Diagnosis     * Open fracture of right side of mandibular body, initial encounter (Multi) [S02.601B] Post-op Diagnosis     * Open fracture of right side of mandibular body, initial encounter (Multi) [S02.601B]     Procedures  Open Reduction Internal Fixation Mandible  70056 - [place] OPEN TX MANDIBULAR FX W/INTERDENTAL FIXATION      Surgeons      * Bishnu Hein - Primary    Resident/Fellow/Other Assistant:  Surgeons and Role:     * Chriss Gudino DDS - Resident - Assisting     * Mame Berger DDS - Resident - Assisting    Procedure Summary  Anesthesia: General  ASA: I  Anesthesia Staff: Anesthesiologist: Jerod Garay DO  C-AA: AMERICO Howard  Anesthesia Resident: Rachel Garza MD  Estimated Blood Loss: 10 mL  Intra-op Medications:   Administrations occurring from 1239 to 1539 on 24:   Medication Name Total Dose   lidocaine-epinephrine (Xylocaine W/EPI) 1 %-1:100,000 injection 6 mL              Anesthesia Record               Intraprocedure I/O Totals          Intake    Dexmedetomidine 0.00 mL    The total shown is the total volume documented since Anesthesia Start was filed.    Total Intake 0 mL          Specimen: No specimens collected     Staff:   Circulator: Erica Galeano RN; Ryan Simpson RN  Scrub Person: Azul Braun; Mena Rodriguez    Findings: Right parasymphysis fracture, minimally displaced.   Complications:  None; patient tolerated the procedure well.     Disposition: PACU - hemodynamically stable.  Condition: stable  Specimens Collected: No specimens collected  Attending Attestation:     Bishnu Hein  Phone Number: [phone]    Post op prescriptions  - Clindamycin 600 mg q8h x 7 days  - Ibuprofen 600 mg q6h  PRN pain  - Chlorhexidine Peridex 0.12% mouthrinse swish and spit 3 times daily

## 2024-04-22 NOTE — DISCHARGE INSTRUCTIONS
MEDICATIONS  ° Pain medication should be taken only during the time that you feel significant discomfort. If the pain is severe, the prescription medication can be used. However, if only mild discomfort is experienced, try to use a less potent, over the counter medication such as Advil (ibuprofen and/or Tylenol (Acetaminophen). These can be taken in crushed tablets or in liquid form.  ° Antibiotics: This medicine should be taken at the appropriate interval as described on the bottle. Be sure not to miss any doses and take the medicine until it is gone.    DRINKING  ° Keep hydrated by drinking at least 8-9 glasses of liquids a day. This is extremely important to prevent dehydration. Signs of dehydration are: dry mouth, dark yellow urine in small amounts, and dizziness with change in position or increased feeling of weakness/tiredness.  ° Do not drink raw milk products for two days after surgery. Raw milk adheres the incision areas and may promote infections. Processed milk products such as canned milk based drinks or cooked milk as in creamed soups are fine.    DIET  ° For the first 5 to 7 days after surgery: During this time the diet should be a pureed or blenderized diet.  ° While this can include soups, and food with baby food consistency, this type of diet does not necessarily mean foods that are of liquid consistency.  ° You can also eat foods such as mashed potatoes, applesauce, oatmeal and pudding. It may be difficult to open your mouth wide enough to get a spoon inside.  A small baby sized spoon may be helpful.  ° Some sort of diet supplement such as Boost, Ensure, or similar substitutes may be used to increase calorie intake.  ° Your diet will be slowly advanced accordingly during follow up.     CHANGES IN BITE  ° It is common for your bite to feel different after surgery. This will gradually settle over the coming weeks.       WOUND CARE  ° We recommend using ice packs on your face for the first 48 hours to  reduce swelling and bruising over the affected areas. We recommend 20 minutes on and 20 minutes off. Make sure to wrap the ice pack in a towel - do not apply directly to skin.  Cold or heat directly on numb areas can lead to permanent damage of the skin.  ° Keep your head elevated, at least 30 degrees, to minimize swelling. This may require you to sleep in a reclining chair or using several pillows in bed.    HYGIENE  ° It is extremely important for you to keep all areas inside your mouth clean after surgery. You should brush your teeth and rinse your mouth after eating. Since you will most likely be eating small meals five or six times a day you will need to clean your teeth at each of these intervals.  ° Each time you brush your teeth place a small amount of toothpaste on the toothbrush and brush all areas of the braces on top and bottom. Also brush all around the splint as best as you can.  ° Rinse your mouth thoroughly with warm salt water or with provided chlorhexidine mouth wash.   ° THE IMPORTANCE OF CLEANING YOUR TEETH CANNOT BE OVEREMPHASIZED. THIS MUST BE DONE THOROUGHLY, SEVERAL TIMES EACH DAY.  ° Using chlorhexidine (Peridex) every day may cause discoloration of teeth. However, this can be removed by your dentist through a routine cleaning. To avoid discoloration, use mouthwash at most 4-5 days per week.    PHYSICAL ACTIVITY  ° Following surgery you will find that your energy level is much lower. This will take some time to return to normal.  ° Although you just had surgery, it is important that you do not stay in bed while awake to minimize the chances of leg clots. We highly encourage occasional standing and walking as tolerated.  ° Try to transfer to a chair instead and walk throughout the house as much as you can tolerate. Try to be as active as possible. The swelling will worsen over the next 3-4 days after surgery and is expected. The swelling will begin to subside over the next few days.  °  Physical exercise such as walking or running can begin 2 or 3 weeks after surgery.  ° When you attempt to return to normal physical activity start slowly and work up to your normal level.  ° It is important that you take deep breaths after surgery to help prevent development of pneumonia. Try to take 10-20 deep breaths every hour while you are awake. Encourage yourself to gently cough if you feel mucous accumulating in the back of your throat or lungs.     HEALING  ° You jaw bones have small bone screws and plates which are used to hold the bones together during the healing period.  ° Like any broken bone it usually takes ~6 weeks until the bones heal.  ° Numbness and changes in sensation in areas of the face is extremely common. This typically resolves with time and should not be an immediate concern, however, we encourage you to discuss changes at your follow-up appointment.    PLEASE REPORT ANY OF THE FOLLOWING TO OUR TEAM:  ° Sudden or excessive bleeding, swelling, or bruising.  ° Any itching, rash, or adverse reaction to medication.  ° Fever over 100 degrees Fahrenheit.  ° Drainage or discharge from the incision sites (other than blood).  ° Any injury to the face over the next 6 weeks.    If you have any questions or concerns please contact us during regular office hours ([phone]).  For any emergency or after hours questions do not hesitate to contact the resident on call. You may call [phone] for the hospital  and ask for the ``Oral Surgeon On Call´´.

## (undated) DEVICE — GOWN, ASTOUND, XL

## (undated) DEVICE — PAD, GROUNDING, ELECTROSURGICAL, W/9 FT CABLE, POLYHESIVE II, ADULT, LF

## (undated) DEVICE — Device

## (undated) DEVICE — SUTURE, VICRYL, 4-0, 18 IN, UNDYED BR PS-2

## (undated) DEVICE — GOLF PENCIL, LF, STERILE

## (undated) DEVICE — TUBE, SALEM SUMP, 16 FR X 48IN, ENFIT

## (undated) DEVICE — SCISSORS, WIRE CUTTER, 4 IN, STERILE, DISP

## (undated) DEVICE — REST, HEAD, BAGEL, 9 IN

## (undated) DEVICE — SUTURE, PLAIN, 5-0, 18 IN, PC1, YELLOW

## (undated) DEVICE — SUCTION, VERSALIGHT MINI W/EXTENDED FRAZIER TIP

## (undated) DEVICE — BATTERY, VARISPEED

## (undated) DEVICE — SPONGE, LAP, XRAY DECT, 18IN X 18IN, W/MASTER DMT, STERILE

## (undated) DEVICE — BUR, EGG, TEN FLUTE, 4 MM, STAINLESS STEEL

## (undated) DEVICE — SUTURE, SURGICAL STEEL, 2, 24 G, 18 IN, MULTIPACK

## (undated) DEVICE — COVER, CART, 45 X 27 X 48 IN, CLEAR

## (undated) DEVICE — DRILL BIT, 4MM EGG

## (undated) DEVICE — MANIFOLD, 4 PORT NEPTUNE STANDARD

## (undated) DEVICE — DRILL, STRYKER 1.6 TWIST 5MM